# Patient Record
Sex: FEMALE | Race: WHITE | NOT HISPANIC OR LATINO
[De-identification: names, ages, dates, MRNs, and addresses within clinical notes are randomized per-mention and may not be internally consistent; named-entity substitution may affect disease eponyms.]

---

## 2017-01-09 PROBLEM — Z00.00 ENCOUNTER FOR PREVENTIVE HEALTH EXAMINATION: Status: ACTIVE | Noted: 2017-01-09

## 2017-09-06 ENCOUNTER — APPOINTMENT (OUTPATIENT)
Dept: CARDIOLOGY | Facility: CLINIC | Age: 67
End: 2017-09-06

## 2017-10-30 ENCOUNTER — APPOINTMENT (OUTPATIENT)
Dept: CARDIOLOGY | Facility: CLINIC | Age: 67
End: 2017-10-30
Payer: MEDICARE

## 2017-10-30 PROCEDURE — 93000 ELECTROCARDIOGRAM COMPLETE: CPT

## 2017-10-30 PROCEDURE — 99214 OFFICE O/P EST MOD 30 MIN: CPT

## 2017-12-14 ENCOUNTER — APPOINTMENT (OUTPATIENT)
Dept: CARDIOLOGY | Facility: CLINIC | Age: 67
End: 2017-12-14
Payer: MEDICARE

## 2017-12-14 PROCEDURE — 93880 EXTRACRANIAL BILAT STUDY: CPT

## 2017-12-14 PROCEDURE — 93306 TTE W/DOPPLER COMPLETE: CPT

## 2018-01-23 ENCOUNTER — APPOINTMENT (OUTPATIENT)
Dept: CARDIOLOGY | Facility: CLINIC | Age: 68
End: 2018-01-23
Payer: MEDICARE

## 2018-01-23 VITALS
DIASTOLIC BLOOD PRESSURE: 60 MMHG | WEIGHT: 136 LBS | BODY MASS INDEX: 22.39 KG/M2 | SYSTOLIC BLOOD PRESSURE: 100 MMHG | HEIGHT: 65.5 IN | HEART RATE: 84 BPM

## 2018-01-23 DIAGNOSIS — Z82.0 FAMILY HISTORY OF EPILEPSY AND OTHER DISEASES OF THE NERVOUS SYSTEM: ICD-10-CM

## 2018-01-23 DIAGNOSIS — Z87.891 PERSONAL HISTORY OF NICOTINE DEPENDENCE: ICD-10-CM

## 2018-01-23 DIAGNOSIS — Z80.9 FAMILY HISTORY OF MALIGNANT NEOPLASM, UNSPECIFIED: ICD-10-CM

## 2018-01-23 PROCEDURE — 99214 OFFICE O/P EST MOD 30 MIN: CPT

## 2018-02-09 ENCOUNTER — OUTPATIENT (OUTPATIENT)
Dept: OUTPATIENT SERVICES | Facility: HOSPITAL | Age: 68
LOS: 1 days | End: 2018-02-09
Payer: MEDICARE

## 2018-02-09 ENCOUNTER — TRANSCRIPTION ENCOUNTER (OUTPATIENT)
Age: 68
End: 2018-02-09

## 2018-02-09 VITALS
SYSTOLIC BLOOD PRESSURE: 104 MMHG | DIASTOLIC BLOOD PRESSURE: 55 MMHG | HEART RATE: 65 BPM | RESPIRATION RATE: 16 BRPM | OXYGEN SATURATION: 100 %

## 2018-02-09 VITALS
RESPIRATION RATE: 17 BRPM | HEIGHT: 64 IN | WEIGHT: 134.48 LBS | TEMPERATURE: 99 F | OXYGEN SATURATION: 100 % | SYSTOLIC BLOOD PRESSURE: 121 MMHG | DIASTOLIC BLOOD PRESSURE: 71 MMHG | HEART RATE: 66 BPM

## 2018-02-09 DIAGNOSIS — H35.341 MACULAR CYST, HOLE, OR PSEUDOHOLE, RIGHT EYE: ICD-10-CM

## 2018-02-09 DIAGNOSIS — Z98.41 CATARACT EXTRACTION STATUS, RIGHT EYE: Chronic | ICD-10-CM

## 2018-02-09 DIAGNOSIS — Z98.890 OTHER SPECIFIED POSTPROCEDURAL STATES: Chronic | ICD-10-CM

## 2018-02-09 PROCEDURE — 67042 VIT FOR MACULAR HOLE: CPT | Mod: RT

## 2018-02-09 PROCEDURE — C1889: CPT

## 2018-02-09 NOTE — ASU DISCHARGE PLAN (ADULT/PEDIATRIC). - NOTIFY
Pain not relieved by Medications/Persistent Nausea and Vomiting/Fever greater than 101/Bleeding that does not stop/Swelling that continues

## 2018-02-09 NOTE — ASU DISCHARGE PLAN (ADULT/PEDIATRIC). - PT EDUC
other (specify)/[employer] gas card, green bracelet, Eye shield with instructions , sunglasses and eye kit given to patient.

## 2018-02-09 NOTE — ASU DISCHARGE PLAN (ADULT/PEDIATRIC). - SPECIAL INSTRUCTIONS
Tylenol as directed as needed for pain. Tylenol as directed as needed for pain.  Face Down position for 3 days as much as possible.

## 2019-02-21 PROBLEM — G25.81 RESTLESS LEGS SYNDROME: Chronic | Status: ACTIVE | Noted: 2018-02-09

## 2019-02-21 PROBLEM — E78.5 HYPERLIPIDEMIA, UNSPECIFIED: Chronic | Status: ACTIVE | Noted: 2018-02-09

## 2019-02-21 PROBLEM — E03.9 HYPOTHYROIDISM, UNSPECIFIED: Chronic | Status: ACTIVE | Noted: 2018-02-09

## 2019-02-28 ENCOUNTER — APPOINTMENT (OUTPATIENT)
Dept: CARDIOLOGY | Facility: CLINIC | Age: 69
End: 2019-02-28

## 2019-03-18 ENCOUNTER — RECORD ABSTRACTING (OUTPATIENT)
Age: 69
End: 2019-03-18

## 2019-03-25 ENCOUNTER — APPOINTMENT (OUTPATIENT)
Dept: CARDIOLOGY | Facility: CLINIC | Age: 69
End: 2019-03-25

## 2021-06-07 ENCOUNTER — NON-APPOINTMENT (OUTPATIENT)
Age: 71
End: 2021-06-07

## 2021-06-11 ENCOUNTER — APPOINTMENT (OUTPATIENT)
Dept: CARDIOLOGY | Facility: CLINIC | Age: 71
End: 2021-06-11
Payer: MEDICARE

## 2021-06-11 ENCOUNTER — NON-APPOINTMENT (OUTPATIENT)
Age: 71
End: 2021-06-11

## 2021-06-11 VITALS
DIASTOLIC BLOOD PRESSURE: 62 MMHG | WEIGHT: 133 LBS | SYSTOLIC BLOOD PRESSURE: 102 MMHG | OXYGEN SATURATION: 98 % | BODY MASS INDEX: 22.16 KG/M2 | HEART RATE: 76 BPM | HEIGHT: 65 IN

## 2021-06-11 PROCEDURE — 99204 OFFICE O/P NEW MOD 45 MIN: CPT

## 2021-06-11 PROCEDURE — 93000 ELECTROCARDIOGRAM COMPLETE: CPT

## 2021-06-11 RX ORDER — B-COMPLEX WITH VITAMIN C
TABLET ORAL
Refills: 0 | Status: ACTIVE | COMMUNITY

## 2021-06-11 NOTE — CARDIOLOGY SUMMARY
[de-identified] : June 11, 2021.  Normal sinus rhythm.  Left anterior hemiblock.  Prominent R wave.  In V1.  Nonspecific ST-T changes.

## 2021-06-11 NOTE — CARDIOLOGY SUMMARY
[de-identified] : June 11, 2021.  Normal sinus rhythm.  Left anterior hemiblock.  Prominent R wave.  In V1.  Nonspecific ST-T changes.

## 2021-06-11 NOTE — REVIEW OF SYSTEMS
[SOB] : no shortness of breath [Dyspnea on exertion] : dyspnea during exertion [Chest Discomfort] : no chest discomfort [Lower Ext Edema] : no extremity edema [Leg Claudication] : no intermittent leg claudication [Palpitations] : no palpitations [Orthopnea] : no orthopnea [PND] : no PND [Syncope] : no syncope [Joint Pain] : no joint pain [Joint Swelling] : no joint swelling [Joint Stiffness] : no joint stiffness [Muscle Cramps] : muscle cramps [Myalgia] : no myalgia [Negative] : Heme/Lymph

## 2021-06-11 NOTE — PHYSICAL EXAM
[Well Developed] : well developed [Well Nourished] : well nourished [No Acute Distress] : no acute distress [Normal Conjunctiva] : normal conjunctiva [Normal Venous Pressure] : normal venous pressure [Normal S1, S2] : normal S1, S2 [No Rub] : no rub [No Gallop] : no gallop [Clear Lung Fields] : clear lung fields [Good Air Entry] : good air entry [No Respiratory Distress] : no respiratory distress  [Soft] : abdomen soft [Non Tender] : non-tender [No Masses/organomegaly] : no masses/organomegaly [Normal Bowel Sounds] : normal bowel sounds [Normal Gait] : normal gait [No Edema] : no edema [No Cyanosis] : no cyanosis [No Clubbing] : no clubbing [No Varicosities] : no varicosities [No Rash] : no rash [No Skin Lesions] : no skin lesions [Moves all extremities] : moves all extremities [No Focal Deficits] : no focal deficits [Normal Speech] : normal speech [Alert and Oriented] : alert and oriented [Normal memory] : normal memory [de-identified] : Decreased carotid upstroke.  Radiated systolic murmur probably at left carotid base. [de-identified] : Systolic ejection murmur 1-2 over 6 at the base.

## 2021-06-11 NOTE — PHYSICAL EXAM
[Well Developed] : well developed [Well Nourished] : well nourished [No Acute Distress] : no acute distress [Normal Conjunctiva] : normal conjunctiva [Normal Venous Pressure] : normal venous pressure [Normal S1, S2] : normal S1, S2 [No Rub] : no rub [No Gallop] : no gallop [Clear Lung Fields] : clear lung fields [Good Air Entry] : good air entry [No Respiratory Distress] : no respiratory distress  [Soft] : abdomen soft [Non Tender] : non-tender [No Masses/organomegaly] : no masses/organomegaly [Normal Bowel Sounds] : normal bowel sounds [Normal Gait] : normal gait [No Edema] : no edema [No Cyanosis] : no cyanosis [No Clubbing] : no clubbing [No Varicosities] : no varicosities [No Rash] : no rash [No Skin Lesions] : no skin lesions [Moves all extremities] : moves all extremities [No Focal Deficits] : no focal deficits [Normal Speech] : normal speech [Alert and Oriented] : alert and oriented [Normal memory] : normal memory [de-identified] : Decreased carotid upstroke.  Radiated systolic murmur probably at left carotid base. [de-identified] : Systolic ejection murmur 1-2 over 6 at the base.

## 2021-06-11 NOTE — HISTORY OF PRESENT ILLNESS
[FreeTextEntry1] : 70-year-old  hyperlipidemia, seeing her nutritionist and  decided unchanged cholesterol lowering agent.\par She has a history of incomplete right bundle branch block, nonischemic stress echocardiogram, mild mitral and aortic insufficiency, gastroesophageal reflux disease, carotid atherosclerosis, hyperlipidemia diet controlled head decided against statin therapy.\par Occasional rare intermittent palpitation without associated dizziness lightheadedness near syncopal or syncopal event\par Stable exertional dyspnea without any associated chest pain no PND orthopnea pedal edema\par Intermittent leg cramps nonexertional\par No recent hospital admission\par

## 2021-06-11 NOTE — REASON FOR VISIT
[Follow-Up - Clinic] : a clinic follow-up of [Abnormal ECG] : an abnormal ECG [Hyperlipidemia] : hyperlipidemia [Mitral Regurgitation] : mitral regurgitation [FreeTextEntry1] : Murmur

## 2021-06-11 NOTE — ASSESSMENT
[FreeTextEntry1] : January 23, 2018.\par Carotid Doppler study. Possible mild atherosclerosis.\par Echocardiogram. December 14, 2017. Preserved systolic function. Mitral regurgitation.\par Labs from October 2017 reviewed.\par Coronary calcium score January 31, 2018 was 0.\par \par Reviewed on June 11, 2021.\par EKG as noted above.\par Most recent labs not available.\par Last labs from September 2017 had shown LDL cholesterol 203  triglycerides 57 total cholesterol 319\par Most recent labs are requested\par \par

## 2021-06-11 NOTE — DISCUSSION/SUMMARY
[FreeTextEntry1] : 70-year-old female with above medical history and active medical problems as noted below\par 1.  Abnormal EKG.  Left anterior hemiblock.  Nonspecific ST-T changes.  Intermittent palpitations.  Atherosclerotic vascular disease with carotid Doppler study in the past showing mild atherosclerosis.\par Stress echocardiogram ordered to assess evaluate and rule out ischemia as a reason for her abnormal EKG.\par 2.  Systolic murmur.  History of mitral and aortic insufficiency.  Borderline pulmonary artery systolic pressure.  Echocardiogram ordered.\par 3.  Carotid Doppler study ordered follow-up on carotid atherosclerotic vascular disease\par 4.  Will obtain labs.  Reviewed with her regarding prior laboratory data.  LDL cholesterol greater than 190.  Evidence of atherosclerotic vascular disease with carotid Doppler study.  Discussed with her regarding risk-benefit alternatives of statin therapy.\par Side effects profile reviewed.  She would like to give it ago with dietary restrictions for next 2 months.  Will repeat fasting lipid panel CRP level.  Based on about test and her labs on diet control will discuss further regarding statin therapy with her with associated risk and benefits.  She understands and agrees with the management plan.\par 6.  Rare intermittent palpitations.  If worsening will consider cardiac monitoring.  At present she has no significant symptoms associated with those palpitations.\par \par Counseling regarding low saturated fat, salt and carbohydrate intake was reviewed. Active lifestyle and regular. Exercise along with weight management is advised.\par All the above were at length reviewed. Answered all the questions. Thank you very much for this kind referral. Please do not hesitate to give me a call for any question.\par Part of this transcription was done with voice recognition software and phonetically similar errors are common. I apologize for that. Please do not hesitate to call for any questions due to above.\par Follow-up 2 months\par

## 2021-07-20 ENCOUNTER — APPOINTMENT (OUTPATIENT)
Dept: CARDIOLOGY | Facility: CLINIC | Age: 71
End: 2021-07-20
Payer: MEDICARE

## 2021-07-20 ENCOUNTER — RESULT CHARGE (OUTPATIENT)
Age: 71
End: 2021-07-20

## 2021-07-20 PROCEDURE — 93306 TTE W/DOPPLER COMPLETE: CPT

## 2021-07-20 PROCEDURE — 93880 EXTRACRANIAL BILAT STUDY: CPT

## 2021-07-27 ENCOUNTER — NON-APPOINTMENT (OUTPATIENT)
Age: 71
End: 2021-07-27

## 2021-09-09 ENCOUNTER — APPOINTMENT (OUTPATIENT)
Dept: CARDIOLOGY | Facility: CLINIC | Age: 71
End: 2021-09-09
Payer: MEDICARE

## 2021-09-09 PROCEDURE — 93351 STRESS TTE COMPLETE: CPT

## 2021-09-14 ENCOUNTER — APPOINTMENT (OUTPATIENT)
Dept: CARDIOLOGY | Facility: CLINIC | Age: 71
End: 2021-09-14
Payer: MEDICARE

## 2021-09-14 VITALS
HEIGHT: 65 IN | HEART RATE: 80 BPM | SYSTOLIC BLOOD PRESSURE: 96 MMHG | WEIGHT: 134 LBS | OXYGEN SATURATION: 98 % | BODY MASS INDEX: 22.33 KG/M2 | DIASTOLIC BLOOD PRESSURE: 58 MMHG

## 2021-09-14 PROCEDURE — 99214 OFFICE O/P EST MOD 30 MIN: CPT

## 2021-09-14 NOTE — ASSESSMENT
[FreeTextEntry1] : January 23, 2018.\par Carotid Doppler study. Possible mild atherosclerosis.\par Echocardiogram. December 14, 2017. Preserved systolic function. Mitral regurgitation.\par Labs from October 2017 reviewed.\par Coronary calcium score January 31, 2018 was 0.\par \par Reviewed on June 11, 2021.\par EKG as noted above.\par Most recent labs not available.\par Last labs from September 2017 had shown LDL cholesterol 203  triglycerides 57 total cholesterol 319\par Most recent labs are requested\par \par reviewed 9/14/21: \par ldl 152, hdl 89, trig 68

## 2021-09-14 NOTE — REASON FOR VISIT
[Follow-Up - Clinic] : a clinic follow-up of [Abnormal ECG] : an abnormal ECG [Hyperlipidemia] : hyperlipidemia [Mitral Regurgitation] : mitral regurgitation [Symptom and Test Evaluation] : symptom and test evaluation [FreeTextEntry3] : Dr. Raines [FreeTextEntry1] : Murmur

## 2021-09-14 NOTE — REVIEW OF SYSTEMS
[Dyspnea on exertion] : dyspnea during exertion [Muscle Cramps] : muscle cramps [Negative] : Heme/Lymph [SOB] : no shortness of breath [Chest Discomfort] : no chest discomfort [Lower Ext Edema] : no extremity edema [Leg Claudication] : no intermittent leg claudication [Palpitations] : no palpitations [Orthopnea] : no orthopnea [PND] : no PND [Syncope] : no syncope [Joint Pain] : no joint pain [Joint Swelling] : no joint swelling [Joint Stiffness] : no joint stiffness [Myalgia] : no myalgia

## 2021-09-14 NOTE — DISCUSSION/SUMMARY
[FreeTextEntry1] : 71-year-old female with above medical history and active medical problems as noted below\par 1.  Abnormal EKG.  Left anterior hemiblock.  Nonspecific ST-T changes.  Intermittent palpitations.  Atherosclerotic vascular disease with carotid Doppler study in the past showing mild atherosclerosis.\par Stress echocardiogram did not reveal any significant high risk ischemia.  Limitation of the test was reviewed.  Continue lifestyle and risk factor modifications.  Change in clinical status may require further invasive or noninvasive testing.\par 2.  Echocardiogram reviewed.  Nonrheumatic mitral and aortic insufficiency stable.  Stable LV left atrial dimensions.  Stable pulmonary artery systolic pressure.  Consider follow-up in 2 to 3 years or for change in symptoms\par 3.  Worsening carotid atherosclerotic vascular disease with high velocity probably related to also tortuosity.  no neurological events.  Reviewed risk benefits alternatives of use of aspirin 81 mg and statin therapy.  She will think about it and let me know.  She understands risk benefits alternatives and options available.  If decided she will start aspirin 81 mg.  And would recommend to give it a try for rosuvastatin 20 mg once daily at nighttime.  She will continue with her aggressive lifestyle and risk factor modifications.\par 4. Rare intermittent palpitations.  If worsening will consider cardiac monitoring.  At present she has no significant symptoms associated with those palpitations.\par \par Counseling regarding low saturated fat, salt and carbohydrate intake was reviewed. Active lifestyle and regular. Exercise along with weight management is advised.\par All the above were at length reviewed. Answered all the questions. Thank you very much for this kind referral. Please do not hesitate to give me a call for any question.\par Part of this transcription was done with voice recognition software and phonetically similar errors are common. I apologize for that. Please do not hesitate to call for any questions due to above.\par \par Follow-up in 1 year.\par

## 2021-09-14 NOTE — HISTORY OF PRESENT ILLNESS
[FreeTextEntry1] : 71-year-old  hyperlipidemia, seeing her nutritionist and  decided unchanged cholesterol lowering agent.\par She has a history of incomplete right bundle branch block, nonischemic stress echocardiogram, mild mitral and aortic insufficiency, gastroesophageal reflux disease, carotid atherosclerosis, hyperlipidemia diet controlled head decided against statin therapy.\par Occasional rare intermittent palpitation without associated dizziness lightheadedness near syncopal or syncopal event\par Stable exertional dyspnea without any associated chest pain no PND orthopnea pedal edema\par Intermittent leg cramps nonexertional\par No recent hospital admission\par

## 2021-09-14 NOTE — CARDIOLOGY SUMMARY
[de-identified] : June 11, 2021.  Normal sinus rhythm.  Left anterior hemiblock.  Prominent R wave.  In V1.  Nonspecific ST-T changes. [de-identified] : 9/9/21: secho; nnischemic symptoms/ekg/echo at 8 mets.  [de-identified] : 7/20/21 lvef 60% mild mr/ar  [de-identified] : 7/20/21 MIKIE 50-69% tortuosity.

## 2021-09-14 NOTE — OBJECTIVE
Routing as a FYI.   [History reviewed] : History reviewed. [Medications and Allergies reviewed] : Medications and allergies reviewed.

## 2021-09-14 NOTE — PHYSICAL EXAM
[Well Developed] : well developed [Well Nourished] : well nourished [No Acute Distress] : no acute distress [Normal Conjunctiva] : normal conjunctiva [Normal Venous Pressure] : normal venous pressure [Normal S1, S2] : normal S1, S2 [No Rub] : no rub [No Gallop] : no gallop [Clear Lung Fields] : clear lung fields [Good Air Entry] : good air entry [Soft] : abdomen soft [No Respiratory Distress] : no respiratory distress  [Non Tender] : non-tender [No Masses/organomegaly] : no masses/organomegaly [Normal Bowel Sounds] : normal bowel sounds [Normal Gait] : normal gait [No Edema] : no edema [No Cyanosis] : no cyanosis [No Clubbing] : no clubbing [No Varicosities] : no varicosities [No Rash] : no rash [No Skin Lesions] : no skin lesions [Moves all extremities] : moves all extremities [No Focal Deficits] : no focal deficits [Normal Speech] : normal speech [Alert and Oriented] : alert and oriented [Normal memory] : normal memory [de-identified] : Decreased carotid upstroke.  Radiated systolic murmur probably at left carotid base. [de-identified] : Systolic ejection murmur 1-2 over 6 at the base.

## 2022-08-03 ENCOUNTER — APPOINTMENT (OUTPATIENT)
Dept: OPHTHALMOLOGY | Facility: CLINIC | Age: 72
End: 2022-08-03

## 2022-08-03 ENCOUNTER — NON-APPOINTMENT (OUTPATIENT)
Age: 72
End: 2022-08-03

## 2022-08-03 PROCEDURE — 92134 CPTRZ OPH DX IMG PST SGM RTA: CPT

## 2022-08-03 PROCEDURE — 99204 OFFICE O/P NEW MOD 45 MIN: CPT

## 2022-09-13 ENCOUNTER — APPOINTMENT (OUTPATIENT)
Dept: CARDIOLOGY | Facility: CLINIC | Age: 72
End: 2022-09-13

## 2022-09-27 ENCOUNTER — APPOINTMENT (OUTPATIENT)
Dept: CARDIOLOGY | Facility: CLINIC | Age: 72
End: 2022-09-27

## 2022-10-25 ENCOUNTER — APPOINTMENT (OUTPATIENT)
Dept: OPHTHALMOLOGY | Facility: CLINIC | Age: 72
End: 2022-10-25

## 2022-12-02 ENCOUNTER — APPOINTMENT (OUTPATIENT)
Dept: OPHTHALMOLOGY | Facility: CLINIC | Age: 72
End: 2022-12-02

## 2023-01-03 ENCOUNTER — APPOINTMENT (OUTPATIENT)
Dept: OPHTHALMOLOGY | Facility: CLINIC | Age: 73
End: 2023-01-03

## 2023-01-11 ENCOUNTER — APPOINTMENT (OUTPATIENT)
Dept: OPHTHALMOLOGY | Facility: CLINIC | Age: 73
End: 2023-01-11

## 2023-01-17 ENCOUNTER — NON-APPOINTMENT (OUTPATIENT)
Age: 73
End: 2023-01-17

## 2023-01-18 ENCOUNTER — NON-APPOINTMENT (OUTPATIENT)
Age: 73
End: 2023-01-18

## 2023-01-30 ENCOUNTER — NON-APPOINTMENT (OUTPATIENT)
Age: 73
End: 2023-01-30

## 2023-01-30 ENCOUNTER — APPOINTMENT (OUTPATIENT)
Dept: CARDIOLOGY | Facility: CLINIC | Age: 73
End: 2023-01-30
Payer: MEDICARE

## 2023-01-30 VITALS
OXYGEN SATURATION: 99 % | DIASTOLIC BLOOD PRESSURE: 68 MMHG | HEIGHT: 65 IN | TEMPERATURE: 96.6 F | HEART RATE: 74 BPM | SYSTOLIC BLOOD PRESSURE: 106 MMHG | WEIGHT: 130 LBS | BODY MASS INDEX: 21.66 KG/M2

## 2023-01-30 PROCEDURE — 99214 OFFICE O/P EST MOD 30 MIN: CPT

## 2023-01-30 PROCEDURE — 93000 ELECTROCARDIOGRAM COMPLETE: CPT

## 2023-01-30 NOTE — HISTORY OF PRESENT ILLNESS
[FreeTextEntry1] : 22-year-old female comes in for follow-up consultation.  Last seen in 2021.  Reviewed labs and EKG\par She has a history of incomplete right bundle branch block, nonischemic stress echocardiogram, mild mitral and aortic insufficiency, gastroesophageal reflux disease, carotid atherosclerosis, hyperlipidemia diet controlled head decided against statin therapy.\par Occasional rare intermittent palpitation without associated dizziness lightheadedness near syncopal or syncopal event\par Stable exertional dyspnea without any associated chest pain no PND orthopnea pedal edema\par Intermittent leg cramps nonexertional\par No recent hospital admission\par

## 2023-01-30 NOTE — ASSESSMENT
[FreeTextEntry1] : January 23, 2018.\par Carotid Doppler study. Possible mild atherosclerosis.\par Echocardiogram. December 14, 2017. Preserved systolic function. Mitral regurgitation.\par Labs from October 2017 reviewed.\par Coronary calcium score January 31, 2018 was 0.\par \par Reviewed on June 11, 2021.\par EKG as noted above.\par Most recent labs not available.\par Last labs from September 2017 had shown LDL cholesterol 203  triglycerides 57 total cholesterol 319\par Most recent labs are requested\par \par reviewed 9/14/21: \par ldl 152, hdl 89, trig 68\par \par Reviewed on January 30, 2023\par EKG as noted above\par Labs from January 9, 2023 reviewed CBC stable CMP normal  HDL 83 triglycerides 80

## 2023-01-30 NOTE — REVIEW OF SYSTEMS
[Dyspnea on exertion] : dyspnea during exertion [Muscle Cramps] : muscle cramps [Negative] : Heme/Lymph [SOB] : no shortness of breath [Chest Discomfort] : no chest discomfort [Lower Ext Edema] : no extremity edema [Leg Claudication] : no intermittent leg claudication [Palpitations] : no palpitations [Orthopnea] : no orthopnea [PND] : no PND [Syncope] : no syncope [Joint Pain] : no joint pain [Joint Swelling] : no joint swelling [Joint Stiffness] : no joint stiffness [Myalgia] : no myalgia [FreeTextEntry5] : As per HPI

## 2023-01-30 NOTE — CARDIOLOGY SUMMARY
[de-identified] : June 11, 2021.  Normal sinus rhythm.  Left anterior hemiblock.  Prominent R wave.  In V1.  Nonspecific ST-T changes.\par January 30, 2023 normal sinus rhythm left anterior hemiblock nonspecific ST-T change [de-identified] : 9/9/21: secho; nnischemic symptoms/ekg/echo at 8 mets.  [de-identified] : 7/20/21 lvef 60% mild mr/ar  [de-identified] : 7/20/21 MIKIE 50-69% tortuosity.

## 2023-01-30 NOTE — PHYSICAL EXAM
[Well Developed] : well developed [Well Nourished] : well nourished [No Acute Distress] : no acute distress [Normal Venous Pressure] : normal venous pressure [Normal S1, S2] : normal S1, S2 [No Rub] : no rub [No Gallop] : no gallop [Clear Lung Fields] : clear lung fields [Good Air Entry] : good air entry [No Respiratory Distress] : no respiratory distress  [Normal Gait] : normal gait [No Edema] : no edema [No Cyanosis] : no cyanosis [No Clubbing] : no clubbing [No Varicosities] : no varicosities [Moves all extremities] : moves all extremities [Normal Speech] : normal speech [Alert and Oriented] : alert and oriented [Normal Radial B/L] : normal radial B/L [Normal DP B/L] : normal DP B/L [de-identified] : Decreased carotid upstroke.  Radiated systolic murmur probably at left carotid base. [de-identified] : Systolic ejection murmur 2-3 over 6 at the base.

## 2023-01-30 NOTE — DISCUSSION/SUMMARY
[FreeTextEntry1] : 72-year-old female with above medical history and active medical problems as noted below\par 1.  Abnormal EKG.  Left anterior hemiblock.  Nonspecific ST-T changes.  Intermittent palpitations.  Atherosclerotic vascular disease with carotid Doppler study in the past showing mild atherosclerosis.\par Stress echocardiogram did not reveal any significant high risk ischemia.  Again reviewed risk benefits alternatives of statin therapy.  She has declined.  Repeat carotid Doppler study if there is progressive worsening we will review again role of pharmacotherapy for lipid management\par 2.   Nonrheumatic mitral and aortic insufficiency.  Follow-up echocardiogram\par 3.  Carotid Doppler study.  Follow-up recommend\par 4. Rare intermittent palpitations.  If worsening will consider cardiac monitoring.  At present she has no significant symptoms associated with those palpitations.\par #5 coronary restratification.  Coronary calcium score recommended which may help us to also decide about statin therapy which she is declining.\par \par Counseling regarding low saturated fat, salt and carbohydrate intake was reviewed. Active lifestyle and regular. Exercise along with weight management is advised.\par All the above were at length reviewed. Answered all the questions. Thank you very much for this kind referral. Please do not hesitate to give me a call for any question.\par Part of this transcription was done with voice recognition software and phonetically similar errors are common. I apologize for that. Please do not hesitate to call for any questions due to above.\par Sincerely,\par Timoteo Amezquita MD,FACC,FERNANDA\par \par \par

## 2023-01-30 NOTE — REASON FOR VISIT
[Symptom and Test Evaluation] : symptom and test evaluation [Follow-Up - Clinic] : a clinic follow-up of [Abnormal ECG] : an abnormal ECG [Hyperlipidemia] : hyperlipidemia [Mitral Regurgitation] : mitral regurgitation [FreeTextEntry1] : Murmur [FreeTextEntry3] : Dr. Raines

## 2023-02-10 ENCOUNTER — APPOINTMENT (OUTPATIENT)
Dept: CARDIOLOGY | Facility: CLINIC | Age: 73
End: 2023-02-10
Payer: MEDICARE

## 2023-02-10 PROCEDURE — 93306 TTE W/DOPPLER COMPLETE: CPT

## 2023-02-10 PROCEDURE — 93880 EXTRACRANIAL BILAT STUDY: CPT

## 2023-02-23 ENCOUNTER — APPOINTMENT (OUTPATIENT)
Dept: CARDIOLOGY | Facility: CLINIC | Age: 73
End: 2023-02-23
Payer: MEDICARE

## 2023-02-23 VITALS
DIASTOLIC BLOOD PRESSURE: 72 MMHG | HEIGHT: 65 IN | TEMPERATURE: 97.1 F | OXYGEN SATURATION: 98 % | HEART RATE: 88 BPM | SYSTOLIC BLOOD PRESSURE: 110 MMHG | WEIGHT: 136 LBS | BODY MASS INDEX: 22.66 KG/M2

## 2023-02-23 DIAGNOSIS — I08.0 RHEUMATIC DISORDERS OF BOTH MITRAL AND AORTIC VALVES: ICD-10-CM

## 2023-02-23 PROCEDURE — 99214 OFFICE O/P EST MOD 30 MIN: CPT

## 2023-02-23 RX ORDER — THYROID, PORCINE 15 MG/1
15 TABLET ORAL DAILY
Refills: 0 | Status: ACTIVE | COMMUNITY

## 2023-02-23 RX ORDER — ESTRADIOL 0.1 MG/G
0.1 CREAM VAGINAL
Refills: 0 | Status: DISCONTINUED | COMMUNITY
End: 2023-02-23

## 2023-02-23 NOTE — PHYSICAL EXAM
[Well Developed] : well developed [Well Nourished] : well nourished [No Acute Distress] : no acute distress [Normal Venous Pressure] : normal venous pressure [Normal S1, S2] : normal S1, S2 [No Rub] : no rub [No Gallop] : no gallop [Clear Lung Fields] : clear lung fields [Good Air Entry] : good air entry [No Respiratory Distress] : no respiratory distress  [Normal Gait] : normal gait [No Edema] : no edema [No Cyanosis] : no cyanosis [No Clubbing] : no clubbing [No Varicosities] : no varicosities [Normal Radial B/L] : normal radial B/L [Normal DP B/L] : normal DP B/L [Moves all extremities] : moves all extremities [Normal Speech] : normal speech [Alert and Oriented] : alert and oriented [de-identified] : Decreased carotid upstroke.  Radiated systolic murmur probably at left carotid base. [de-identified] : Systolic ejection murmur 2-3 over 6 at the base.

## 2023-02-23 NOTE — DISCUSSION/SUMMARY
[FreeTextEntry1] : 72-year-old female with above medical history and active medical problems as noted below\par 1.  Abnormal EKG.  Left anterior hemiblock.  Nonspecific ST-T changes.  Intermittent palpitations.  Atherosclerotic vascular disease with carotid Doppler study in the past showing mild atherosclerosis.\par Stress echocardiogram did not reveal any significant high risk ischemia.  Again reviewed risk benefits alternatives of statin therapy.  She has declined.  Repeat carotid Doppler study if there is progressive worsening we will review again role of pharmacotherapy for lipid management\par 2.   Nonrheumatic mitral and aortic insufficiency.  Follow-up echocardiogram\par 3.  Carotid Doppler study.  Follow-up recommend\par 4. Rare intermittent palpitations.  If worsening will consider cardiac monitoring.  At present she has no significant symptoms associated with those palpitations.\par #5 coronary restratification.  Coronary calcium score recommended which may help us to also decide about statin therapy which she is declining.\par \par \par On today's visit I reviewed results of echocardiogram and carotid ultrasound with the patient.  She has known CAD calcium score which was 0 in the beginning of this year.  She is physically very active exercising boxing 5 times a week.  Transthoracic echo shows normal ejection fraction and no significant valvular heart disease.  Carotid ultrasound reviewed minimal carotid atherosclerotic sclerosis.  She has known high LDL.  She was offered statin however she is declining.  I asked her to start baby aspirin.  She has scheduled repeat blood work and follow-up with her cardiologist.  She wants to try to improve diet and see if there is any improvement in lipid profile.\par \par \par

## 2023-02-23 NOTE — CARDIOLOGY SUMMARY
[de-identified] : June 11, 2021.  Normal sinus rhythm.  Left anterior hemiblock.  Prominent R wave.  In V1.  Nonspecific ST-T changes.\par January 30, 2023 normal sinus rhythm left anterior hemiblock nonspecific ST-T change [de-identified] : 9/9/21: secho; nnischemic symptoms/ekg/echo at 8 mets.  [de-identified] : 7/20/21 lvef 60% mild mr/ar  [de-identified] : 7/20/21 MIKIE 50-69% tortuosity.

## 2023-02-23 NOTE — HISTORY OF PRESENT ILLNESS
[FreeTextEntry1] : 72-year-old female comes in for follow-up .  Reviewed labs and EKG\par She has a history of incomplete right bundle branch block, nonischemic stress echocardiogram, mild mitral and aortic insufficiency, gastroesophageal reflux disease, carotid atherosclerosis, hyperlipidemia diet controlled head decided against statin therapy.\par Occasional rare intermittent palpitation without associated dizziness lightheadedness near syncopal or syncopal event\par Stable exertional dyspnea without any associated chest pain no PND orthopnea pedal edema\par Intermittent leg cramps nonexertional\par No recent hospital admission\par

## 2023-02-24 ENCOUNTER — APPOINTMENT (OUTPATIENT)
Dept: ORTHOPEDIC SURGERY | Facility: CLINIC | Age: 73
End: 2023-02-24
Payer: MEDICARE

## 2023-02-24 VITALS — BODY MASS INDEX: 23.22 KG/M2 | WEIGHT: 136 LBS | HEIGHT: 64 IN

## 2023-02-24 DIAGNOSIS — M19.049 PRIMARY OSTEOARTHRITIS, UNSPECIFIED HAND: ICD-10-CM

## 2023-02-24 DIAGNOSIS — Z78.9 OTHER SPECIFIED HEALTH STATUS: ICD-10-CM

## 2023-02-24 PROCEDURE — 99203 OFFICE O/P NEW LOW 30 MIN: CPT

## 2023-02-24 PROCEDURE — 73130 X-RAY EXAM OF HAND: CPT | Mod: 50

## 2023-02-24 NOTE — HISTORY OF PRESENT ILLNESS
[de-identified] : 72F, RHD, PMHx of Thyroid Disease presents with bilateral hand pain, particularly left hand small finger lack of motion. Some other fingers are causing pain. Has had symptoms for the last 6-12 months. Admits to having arthritic fingers. Denies numbness/tingling. Denies outside imaging/treatment. Denies injury/trauma.

## 2023-02-24 NOTE — IMAGING
[de-identified] : Bilateral hand with diffuse IP arthrosis, +ttp. Able to make fist, oppose thumb to small finger and abduct fingers. Sensation intact throughout. <2sec cap refill.\par \par Bilateral hand radiographs with no fracture nor dislocation. Advanced diffuse DIP and PIP arthrosis.

## 2023-02-24 NOTE — ASSESSMENT
[FreeTextEntry1] : Diffuse finger IP arthrosis - reviewed radiographs and pathoanatomy with patient. Discussed management to consist of NSAIDs prn, finger sleeves, OT prn. Fusion option for advanced pain.\par \par F/u prn

## 2023-03-29 ENCOUNTER — APPOINTMENT (OUTPATIENT)
Dept: CARDIOLOGY | Facility: CLINIC | Age: 73
End: 2023-03-29
Payer: MEDICARE

## 2023-03-29 VITALS
OXYGEN SATURATION: 97 % | BODY MASS INDEX: 23.39 KG/M2 | HEART RATE: 77 BPM | HEIGHT: 64 IN | WEIGHT: 137 LBS | SYSTOLIC BLOOD PRESSURE: 102 MMHG | DIASTOLIC BLOOD PRESSURE: 60 MMHG

## 2023-03-29 PROCEDURE — 99215 OFFICE O/P EST HI 40 MIN: CPT

## 2023-03-29 RX ORDER — MULTIVIT-MIN/FOLIC/VIT K/LYCOP 400-300MCG
1000 TABLET ORAL
Refills: 0 | Status: ACTIVE | COMMUNITY

## 2023-03-29 NOTE — HISTORY OF PRESENT ILLNESS
[FreeTextEntry1] : 72-year-old female comes in for follow-up consultation.  Reviewed labs.  Labs were reviewed tests were reviewed\par She has a history of incomplete right bundle branch block, nonischemic stress echocardiogram, mild mitral and aortic insufficiency, gastroesophageal reflux disease, carotid atherosclerosis, hyperlipidemia diet controlled head decided against statin therapy.\par Occasional rare intermittent palpitation without associated dizziness lightheadedness near syncopal or syncopal event\par Stable exertional dyspnea without any associated chest pain no PND orthopnea pedal edema\par Intermittent leg cramps nonexertional\par No recent hospital admission\par

## 2023-03-29 NOTE — CARDIOLOGY SUMMARY
[de-identified] : June 11, 2021.  Normal sinus rhythm.  Left anterior hemiblock.  Prominent R wave.  In V1.  Nonspecific ST-T changes.\par January 30, 2023 normal sinus rhythm left anterior hemiblock nonspecific ST-T change [de-identified] : 9/9/21: secho; nnischemic symptoms/ekg/echo at 8 mets.  [de-identified] : 7/20/21 lvef 60% mild mr/ar \par February 2023.  Preserved EF.  Mild to moderate AR.  Mild MR [de-identified] : March 2023 coronary calcium score 0.  Aortic calcification. [de-identified] : 7/20/21 MIKIE 50-69% tortuosity.\par February 2023.  Mild to moderate atherosclerosis.

## 2023-03-29 NOTE — DISCUSSION/SUMMARY
[FreeTextEntry1] : 72-year-old female with above medical history and active medical problems as noted below\par 1.  Abnormal EKG.  Left anterior hemiblock.  Nonspecific ST-T changes.  Intermittent palpitations.  Atherosclerotic vascular disease with carotid Doppler study and aortic calcification.\par Evidence of ASCVD reviewed at length with her.\par Atherosclerosis: From the available data evidence of atherosclerosis was reviewed.  We have discussed atherosclerosis is a generalized process.  The pathophysiology of the atherosclerosis  and associated morbidity mortality were reviewed.  Lifestyle modification and risk factors associated with atherosclerosis were discussed.  changes needed in lifestyle and risk factors were reviewed at length.  Understands prevention is the best way  in decreasing morbidity mortality and overall improving long-term prognosis.\par Reviewed risk benefits alternatives and side effects of statin therapy along with aspirin at length.\par Extensive amount of time was spent reviewing and answering all her questions.\par She still has not been sure whether she wants to take the medication understanding about.\par If she decided she would benefit from moderate to intensified statin therapy along with 81 mg of aspirin.\par 2.   Nonrheumatic mitral and aortic insufficiency.  Stable echocardiogram\par 3.  Carotid Doppler study.  Mild to moderate atherosclerosis.  Aspirin and statin therapy recommended\par 4. Rare intermittent palpitations.  If worsening will consider cardiac monitoring.  At present she has no significant symptoms associated with those palpitations.\par \par \par Counseling regarding low saturated fat, salt and carbohydrate intake was reviewed. Active lifestyle and regular. Exercise along with weight management is advised.\par All the above were at length reviewed. Answered all the questions. Thank you very much for this kind referral. Please do not hesitate to give me a call for any question.\par Part of this transcription was done with voice recognition software and phonetically similar errors are common. I apologize for that. Please do not hesitate to call for any questions due to above.\par Sincerely,\par Timoteo Amezquita MD,FACC,FERNANDA\par \par \par

## 2023-03-29 NOTE — ASSESSMENT
[FreeTextEntry1] : January 23, 2018.\par Carotid Doppler study. Possible mild atherosclerosis.\par Echocardiogram. December 14, 2017. Preserved systolic function. Mitral regurgitation.\par Labs from October 2017 reviewed.\par Coronary calcium score January 31, 2018 was 0.\par \par Reviewed on June 11, 2021.\par EKG as noted above.\par Most recent labs not available.\par Last labs from September 2017 had shown LDL cholesterol 203  triglycerides 57 total cholesterol 319\par Most recent labs are requested\par \par reviewed 9/14/21: \par ldl 152, hdl 89, trig 68\par \par Reviewed on January 30, 2023\par EKG as noted above\par Labs from January 9, 2023 reviewed CBC stable CMP normal  HDL 83 triglycerides 80\par \par Reviewed.  March 29 2023.\par Echocardiogram carotid Doppler study were reviewed.\par Coronary calcium score was reviewed which showed 0 calcium score.  Calcification of the ascending aorta\par Reviewed labs.   HDL 76 triglycerides 79 total cholesterol 234

## 2023-03-29 NOTE — PHYSICAL EXAM
[Well Developed] : well developed [Well Nourished] : well nourished [No Acute Distress] : no acute distress [Normal Venous Pressure] : normal venous pressure [Normal S1, S2] : normal S1, S2 [No Rub] : no rub [No Gallop] : no gallop [Clear Lung Fields] : clear lung fields [Good Air Entry] : good air entry [No Respiratory Distress] : no respiratory distress  [Normal Gait] : normal gait [No Edema] : no edema [No Cyanosis] : no cyanosis [No Clubbing] : no clubbing [No Varicosities] : no varicosities [Normal Radial B/L] : normal radial B/L [Normal DP B/L] : normal DP B/L [Moves all extremities] : moves all extremities [Normal Speech] : normal speech [Alert and Oriented] : alert and oriented [de-identified] : Decreased carotid upstroke.  Radiated systolic murmur probably at left carotid base. [de-identified] : Systolic ejection murmur 2-3 over 6 at the base.

## 2023-10-16 ENCOUNTER — APPOINTMENT (OUTPATIENT)
Dept: CARDIOLOGY | Facility: CLINIC | Age: 73
End: 2023-10-16
Payer: MEDICARE

## 2023-10-16 VITALS
BODY MASS INDEX: 23.39 KG/M2 | SYSTOLIC BLOOD PRESSURE: 110 MMHG | HEIGHT: 64 IN | OXYGEN SATURATION: 98 % | WEIGHT: 137 LBS | HEART RATE: 80 BPM | DIASTOLIC BLOOD PRESSURE: 68 MMHG

## 2023-10-16 PROCEDURE — 99214 OFFICE O/P EST MOD 30 MIN: CPT

## 2023-10-16 RX ORDER — UBIDECARENONE 50 MG
600 CAPSULE ORAL
Refills: 0 | Status: DISCONTINUED | COMMUNITY
End: 2023-10-16

## 2023-11-02 ENCOUNTER — NON-APPOINTMENT (OUTPATIENT)
Age: 73
End: 2023-11-02

## 2023-11-03 ENCOUNTER — APPOINTMENT (OUTPATIENT)
Dept: CARDIOLOGY | Facility: CLINIC | Age: 73
End: 2023-11-03
Payer: MEDICARE

## 2023-11-03 ENCOUNTER — NON-APPOINTMENT (OUTPATIENT)
Age: 73
End: 2023-11-03

## 2023-11-03 VITALS
DIASTOLIC BLOOD PRESSURE: 70 MMHG | BODY MASS INDEX: 23.39 KG/M2 | WEIGHT: 137 LBS | HEART RATE: 63 BPM | HEIGHT: 64 IN | SYSTOLIC BLOOD PRESSURE: 110 MMHG | OXYGEN SATURATION: 98 %

## 2023-11-03 DIAGNOSIS — R07.89 OTHER CHEST PAIN: ICD-10-CM

## 2023-11-03 DIAGNOSIS — R94.31 ABNORMAL ELECTROCARDIOGRAM [ECG] [EKG]: ICD-10-CM

## 2023-11-03 PROCEDURE — 93000 ELECTROCARDIOGRAM COMPLETE: CPT

## 2023-11-03 PROCEDURE — 99214 OFFICE O/P EST MOD 30 MIN: CPT

## 2023-11-03 RX ORDER — EZETIMIBE 10 MG/1
10 TABLET ORAL
Qty: 90 | Refills: 1 | Status: ACTIVE | COMMUNITY
Start: 2023-11-03 | End: 1900-01-01

## 2023-11-03 RX ORDER — ATORVASTATIN CALCIUM 20 MG/1
20 TABLET, FILM COATED ORAL
Qty: 90 | Refills: 3 | Status: DISCONTINUED | COMMUNITY
Start: 2023-10-16 | End: 2023-11-03

## 2024-01-17 ENCOUNTER — NON-APPOINTMENT (OUTPATIENT)
Age: 74
End: 2024-01-17

## 2024-01-17 ENCOUNTER — APPOINTMENT (OUTPATIENT)
Dept: OPHTHALMOLOGY | Facility: CLINIC | Age: 74
End: 2024-01-17
Payer: MEDICARE

## 2024-01-17 PROCEDURE — 92014 COMPRE OPH EXAM EST PT 1/>: CPT

## 2024-01-17 PROCEDURE — 92134 CPTRZ OPH DX IMG PST SGM RTA: CPT

## 2024-04-09 ENCOUNTER — APPOINTMENT (OUTPATIENT)
Dept: CARDIOLOGY | Facility: CLINIC | Age: 74
End: 2024-04-09
Payer: MEDICARE

## 2024-04-09 ENCOUNTER — TRANSCRIPTION ENCOUNTER (OUTPATIENT)
Age: 74
End: 2024-04-09

## 2024-04-09 PROCEDURE — 93880 EXTRACRANIAL BILAT STUDY: CPT

## 2024-04-09 PROCEDURE — 93306 TTE W/DOPPLER COMPLETE: CPT

## 2024-05-07 ENCOUNTER — APPOINTMENT (OUTPATIENT)
Dept: CARDIOLOGY | Facility: CLINIC | Age: 74
End: 2024-05-07
Payer: MEDICARE

## 2024-05-07 VITALS
DIASTOLIC BLOOD PRESSURE: 64 MMHG | OXYGEN SATURATION: 99 % | WEIGHT: 137 LBS | BODY MASS INDEX: 23.52 KG/M2 | HEART RATE: 58 BPM | SYSTOLIC BLOOD PRESSURE: 120 MMHG

## 2024-05-07 DIAGNOSIS — E78.5 HYPERLIPIDEMIA, UNSPECIFIED: ICD-10-CM

## 2024-05-07 DIAGNOSIS — R25.2 CRAMP AND SPASM: ICD-10-CM

## 2024-05-07 DIAGNOSIS — I65.23 OCCLUSION AND STENOSIS OF BILATERAL CAROTID ARTERIES: ICD-10-CM

## 2024-05-07 DIAGNOSIS — I70.0 ATHEROSCLEROSIS OF AORTA: ICD-10-CM

## 2024-05-07 DIAGNOSIS — I34.0 NONRHEUMATIC MITRAL (VALVE) INSUFFICIENCY: ICD-10-CM

## 2024-05-07 DIAGNOSIS — I35.1 NONRHEUMATIC AORTIC (VALVE) INSUFFICIENCY: ICD-10-CM

## 2024-05-07 PROCEDURE — G2211 COMPLEX E/M VISIT ADD ON: CPT

## 2024-05-07 PROCEDURE — 99214 OFFICE O/P EST MOD 30 MIN: CPT

## 2024-05-07 RX ORDER — ATORVASTATIN CALCIUM 10 MG/1
10 TABLET, FILM COATED ORAL DAILY
Qty: 90 | Refills: 3 | Status: ACTIVE | COMMUNITY
Start: 2024-05-07 | End: 1900-01-01

## 2024-05-07 RX ORDER — MAGNESIUM OXIDE/MAG AA CHELATE 300 MG
CAPSULE ORAL
Refills: 0 | Status: ACTIVE | COMMUNITY

## 2024-05-07 RX ORDER — ESTROGENS, CONJUGATED 0.45 MG/1
TABLET, FILM COATED ORAL WEEKLY
Refills: 0 | Status: ACTIVE | COMMUNITY

## 2024-05-07 NOTE — HISTORY OF PRESENT ILLNESS
[FreeTextEntry1] : 73-year-old comes into the office for hospital follow-up.  Main problem is plantar fasciitis.  Could not tolerate atorvastatin in the past.  Now tolerating Zetia 10 mg without any significant new symptoms.  Labs were reviewed.  Echocardiogram was reviewed.  She has no complaint of chest pain.  No PND orthopnea palpitation dizziness lightheadedness visual disturbances or focal weakness.  She has continued cramping in the legs though less after stopping atorvastatin.  She has a history of incomplete right bundle branch block, nonischemic stress echocardiogram, mild mitral and mild to moderate aortic insufficiency, gastroesophageal reflux disease, carotid atherosclerosis, hyperlipidemia diet controlled head decided against statin therapy. Occasional rare intermittent palpitation without associated dizziness lightheadedness near syncopal or syncopal eventNo recent hospital admission

## 2024-05-07 NOTE — REVIEW OF SYSTEMS
[Dyspnea on exertion] : dyspnea during exertion [Chest Discomfort] : chest discomfort [Muscle Cramps] : muscle cramps [Negative] : Heme/Lymph [SOB] : no shortness of breath [Lower Ext Edema] : no extremity edema [Leg Claudication] : no intermittent leg claudication [Palpitations] : no palpitations [Orthopnea] : no orthopnea [PND] : no PND [Syncope] : no syncope [Joint Pain] : no joint pain [Joint Swelling] : no joint swelling [Joint Stiffness] : no joint stiffness [Myalgia] : no myalgia [FreeTextEntry5] : As per HPI

## 2024-05-07 NOTE — DISCUSSION/SUMMARY
[FreeTextEntry1] : 73-year-old female with above medical history and active medical problems as noted below 1.  Labs reviewed.  Mild elevation in CPK.  Mild elevation LFT.  Plan to fasciitis.  Repeat labs with hep screen. 2.  Abnormal EKG.  Left anterior hemiblock.  Nonspecific ST-T changes.  Intermittent palpitations.  Atherosclerotic vascular disease with carotid Doppler study and aortic calcification. Evidence of ASCVD reviewed at length with her.  Coronary calcium score was 0. #3 dyslipidemia.   Lifestyle modifications. Zetia 10 mg.  Reviewed again risk benefits alternatives of lower dose of atorvastatin in presence of ASCVD on carotid Doppler study.  She is agreeable to try it 3 times a week.  If significant worsening symptoms she will stop it.  Will also check CPK and LFT in 4 weeks after starting the medication.  Understands risk benefits and alternatives. #4 nonrheumatic mitral and aortic insufficiency.  Stable echocardiogram reviewed.  Continue to follow on 1-2 yearly basis or based on clinical status. #5  Carotid Doppler study.  Mild to moderate atherosclerosis.  Aspirin and statin therapy recommended. Carotid Doppler study reviewed.  Stable findings discussed.    Counseling regarding low saturated fat, salt and carbohydrate intake was reviewed. Active lifestyle and regular. Exercise along with weight management is advised. All the above were at length reviewed. Answered all the questions. Thank you very much for this kind referral. Please do not hesitate to give me a call for any question. Part of this transcription was done with voice recognition software and phonetically similar errors are common. I apologize for that. Please do not hesitate to call for any questions due to above. Sincerely, Timoteo Amezquita MD,FACC,FERNANDA

## 2024-05-07 NOTE — CARDIOLOGY SUMMARY
[de-identified] : June 11, 2021.  Normal sinus rhythm.  Left anterior hemiblock.  Prominent R wave.  In V1.  Nonspecific ST-T changes. January 30, 2023 normal sinus rhythm left anterior hemiblock nonspecific ST-T change November 3, 2023 normal sinus rhythm left anterior hemiblock nonspecific ST-T changes Reviewed EKG from November 2, 2023 emergency room normal significant new changes. [de-identified] : 9/9/21: secho; nnischemic symptoms/ekg/echo at 8 mets.  [de-identified] : 7/20/21 lvef 60% mild mr/ar  February 2023.  Preserved EF.  Mild to moderate AR.  Mild MR April 9, 2024.  EF 55 to 60% LAE.  Mild to moderate AR, trace MR and TR.  PASP 23. [de-identified] : March 2023 coronary calcium score 0.  Aortic calcification. [de-identified] : 7/20/21 MIKIE 50-69% tortuosity. April 9, 2024.  Bilateral carotid Doppler study.  Tortuosity.  Mild to moderate nonobstructive disease.  Elevated vertebral arteries velocities. February 2023.  Mild to moderate atherosclerosis. [de-identified] : Chest x-ray November 2, 2023 at [facility] no acute pathology.

## 2024-05-07 NOTE — ASSESSMENT
[FreeTextEntry1] : Reviewed on November 2 October 2023 EKG as noted above from November 2 and November 3. Chest x-ray November 2, 2023 reviewed Labs in November 2, 2023 had shown stable CBC CMP.  Troponins were negative x2.  Reviewed on May 7, 2024. Echocardiogram April 2024 mild to moderate AR trace MR EF 55 to 60%. Most recent labs reviewed.  April 15, 2024.  Minimal elevation of CPK and LFT.  CMP otherwise stable. Carotid Doppler study reviewed.

## 2024-05-07 NOTE — PHYSICAL EXAM
[Well Developed] : well developed [Well Nourished] : well nourished [No Acute Distress] : no acute distress [Normal Venous Pressure] : normal venous pressure [Normal S1, S2] : normal S1, S2 [No Rub] : no rub [No Gallop] : no gallop [Clear Lung Fields] : clear lung fields [Good Air Entry] : good air entry [No Respiratory Distress] : no respiratory distress  [Normal Gait] : normal gait [No Edema] : no edema [No Cyanosis] : no cyanosis [No Clubbing] : no clubbing [No Varicosities] : no varicosities [Normal Radial B/L] : normal radial B/L [Normal DP B/L] : normal DP B/L [Moves all extremities] : moves all extremities [Normal Speech] : normal speech [Alert and Oriented] : alert and oriented [de-identified] : Decreased carotid upstroke.  Radiated systolic murmur probably at left carotid base. [de-identified] : Systolic ejection murmur 2-3 over 6 at the base.

## 2024-09-05 ENCOUNTER — APPOINTMENT (OUTPATIENT)
Dept: OPHTHALMOLOGY | Facility: CLINIC | Age: 74
End: 2024-09-05
Payer: MEDICARE

## 2024-09-05 ENCOUNTER — NON-APPOINTMENT (OUTPATIENT)
Age: 74
End: 2024-09-05

## 2024-09-05 PROCEDURE — 92012 INTRM OPH EXAM EST PATIENT: CPT

## 2024-11-25 ENCOUNTER — RX RENEWAL (OUTPATIENT)
Age: 74
End: 2024-11-25

## 2024-11-26 ENCOUNTER — APPOINTMENT (OUTPATIENT)
Dept: CARDIOLOGY | Facility: CLINIC | Age: 74
End: 2024-11-26
Payer: MEDICARE

## 2024-11-26 ENCOUNTER — NON-APPOINTMENT (OUTPATIENT)
Age: 74
End: 2024-11-26

## 2024-11-26 VITALS
SYSTOLIC BLOOD PRESSURE: 102 MMHG | DIASTOLIC BLOOD PRESSURE: 70 MMHG | BODY MASS INDEX: 23.39 KG/M2 | HEART RATE: 59 BPM | HEIGHT: 64 IN | OXYGEN SATURATION: 98 % | WEIGHT: 137 LBS

## 2024-11-26 DIAGNOSIS — I08.0 RHEUMATIC DISORDERS OF BOTH MITRAL AND AORTIC VALVES: ICD-10-CM

## 2024-11-26 DIAGNOSIS — I65.23 OCCLUSION AND STENOSIS OF BILATERAL CAROTID ARTERIES: ICD-10-CM

## 2024-11-26 DIAGNOSIS — R94.31 ABNORMAL ELECTROCARDIOGRAM [ECG] [EKG]: ICD-10-CM

## 2024-11-26 DIAGNOSIS — E78.5 HYPERLIPIDEMIA, UNSPECIFIED: ICD-10-CM

## 2024-11-26 DIAGNOSIS — I35.1 NONRHEUMATIC AORTIC (VALVE) INSUFFICIENCY: ICD-10-CM

## 2024-11-26 DIAGNOSIS — I70.0 ATHEROSCLEROSIS OF AORTA: ICD-10-CM

## 2024-11-26 PROCEDURE — 99214 OFFICE O/P EST MOD 30 MIN: CPT

## 2024-11-26 PROCEDURE — 93000 ELECTROCARDIOGRAM COMPLETE: CPT

## 2025-01-23 ENCOUNTER — APPOINTMENT (OUTPATIENT)
Dept: OPHTHALMOLOGY | Facility: CLINIC | Age: 75
End: 2025-01-23
Payer: MEDICARE

## 2025-01-23 ENCOUNTER — NON-APPOINTMENT (OUTPATIENT)
Age: 75
End: 2025-01-23

## 2025-01-23 PROCEDURE — 92134 CPTRZ OPH DX IMG PST SGM RTA: CPT

## 2025-01-23 PROCEDURE — 92014 COMPRE OPH EXAM EST PT 1/>: CPT

## 2025-01-27 ENCOUNTER — APPOINTMENT (OUTPATIENT)
Dept: ORTHOPEDIC SURGERY | Facility: CLINIC | Age: 75
End: 2025-01-27
Payer: MEDICARE

## 2025-01-27 DIAGNOSIS — S52.531A COLLES' FRACTURE OF RIGHT RADIUS, INITIAL ENCOUNTER FOR CLOSED FRACTURE: ICD-10-CM

## 2025-01-27 DIAGNOSIS — S52.532A COLLES' FRACTURE OF LEFT RADIUS, INITIAL ENCOUNTER FOR CLOSED FRACTURE: ICD-10-CM

## 2025-01-27 DIAGNOSIS — S52.571A OTHER INTRAARTICULAR FRACTURE OF LOWER END OF RIGHT RADIUS, INITIAL ENCOUNTER FOR CLOSED FRACTURE: ICD-10-CM

## 2025-01-27 PROCEDURE — 99214 OFFICE O/P EST MOD 30 MIN: CPT

## 2025-01-27 PROCEDURE — 99204 OFFICE O/P NEW MOD 45 MIN: CPT

## 2025-01-27 PROCEDURE — L3908: CPT | Mod: PD

## 2025-01-27 PROCEDURE — A4565: CPT | Mod: PD

## 2025-01-27 RX ORDER — ESTROGENS, CONJUGATED 0.45 MG/1
TABLET, FILM COATED ORAL
Refills: 0 | Status: ACTIVE | COMMUNITY

## 2025-01-28 ENCOUNTER — APPOINTMENT (OUTPATIENT)
Dept: ORTHOPEDIC SURGERY | Facility: CLINIC | Age: 75
End: 2025-01-28

## 2025-01-28 ENCOUNTER — OUTPATIENT (OUTPATIENT)
Dept: OUTPATIENT SERVICES | Facility: HOSPITAL | Age: 75
LOS: 1 days | End: 2025-01-28
Payer: MEDICARE

## 2025-01-28 ENCOUNTER — APPOINTMENT (OUTPATIENT)
Facility: CLINIC | Age: 75
End: 2025-01-28

## 2025-01-28 VITALS
OXYGEN SATURATION: 98 % | WEIGHT: 139.99 LBS | SYSTOLIC BLOOD PRESSURE: 147 MMHG | TEMPERATURE: 98 F | HEART RATE: 83 BPM | RESPIRATION RATE: 16 BRPM | DIASTOLIC BLOOD PRESSURE: 83 MMHG | HEIGHT: 64 IN

## 2025-01-28 DIAGNOSIS — S62.009A UNSPECIFIED FRACTURE OF NAVICULAR [SCAPHOID] BONE OF UNSPECIFIED WRIST, INITIAL ENCOUNTER FOR CLOSED FRACTURE: ICD-10-CM

## 2025-01-28 DIAGNOSIS — M25.532 PAIN IN RIGHT WRIST: ICD-10-CM

## 2025-01-28 DIAGNOSIS — Z98.891 HISTORY OF UTERINE SCAR FROM PREVIOUS SURGERY: Chronic | ICD-10-CM

## 2025-01-28 DIAGNOSIS — S62.009B: ICD-10-CM

## 2025-01-28 DIAGNOSIS — M25.531 PAIN IN RIGHT WRIST: ICD-10-CM

## 2025-01-28 DIAGNOSIS — Z98.890 OTHER SPECIFIED POSTPROCEDURAL STATES: Chronic | ICD-10-CM

## 2025-01-28 DIAGNOSIS — Z01.818 ENCOUNTER FOR OTHER PREPROCEDURAL EXAMINATION: ICD-10-CM

## 2025-01-28 DIAGNOSIS — Z98.41 CATARACT EXTRACTION STATUS, RIGHT EYE: Chronic | ICD-10-CM

## 2025-01-28 DIAGNOSIS — S62.109A FRACTURE OF UNSPECIFIED CARPAL BONE, UNSPECIFIED WRIST, INITIAL ENCOUNTER FOR CLOSED FRACTURE: ICD-10-CM

## 2025-01-28 PROCEDURE — 73110 X-RAY EXAM OF WRIST: CPT | Mod: 50

## 2025-01-28 PROCEDURE — 99214 OFFICE O/P EST MOD 30 MIN: CPT | Mod: 57

## 2025-01-28 NOTE — H&P PST ADULT - NSICDXPROCEDURE_GEN_ALL_CORE_FT
PROCEDURES:  ORIF, fracture, distal tibia, right 28-Jan-2025 14:00:10 left distal radius fracture closed Catherine Lawler

## 2025-01-28 NOTE — H&P PST ADULT - NS MD HP INPLANTS MED DEV
Physical Therapy Evaluation and DC NOTE    Visit Count: 1     Seeing PT for post operative cervical surgery    PAYOR: MEDICARE  AUTHORIZATION NEEDED: NO, FOLLOW MEDICARE GUIDELINES     THRESHOLD AMOUNT: $2150.00  (2022)     PT AND ST COMBINED MET:$734.87           OT SEPARATE MET:$0     IF CONTINUED SERVICES ARE REASONABLE AND NECESSARY BEYOND $2150.00, A KX MODIFIER WILL BE NEEDED.  If you want to attest that continued services are medically necessary, please add:  HOD = KX MODIFER AT VISIT 16, AMB = KX MODIFIER AT VISIT 22      Referred by: BRIELLE Huber; Next provider visit (if known/scheduled): *prn  Medical Diagnosis (from order):   Diagnosis Information      Diagnosis    V72.84 (ICD-9-CM) - Z01.818 (ICD-10-CM) - Preop testing    162.9 (ICD-9-CM) - C34.92 (ICD-10-CM) - Non-small cell cancer of left lung (CMS/HCC)              Treatment Diagnosis: wekaness pending lung surgery  symptoms with impaired strength, impaired activity tolerance    Chart reviewed at time of initial evaluation (relevant co-morbidities, allergies, tests and medications listed)      Mr. Gonzales is a 75 year old male with a past medical history of diverticulosis, GERD, HTN, HLD, prostate cancer, cervical myelopathy s/p C3 through 7 decompression and fusion, CLL and biopsy proven adenocarcinoma of the left upper lobe lung nodule.    VATS scheduled for 12/1/22    SUBJECTIVE     Current Symptoms:  Has pending lung surgery 12/1/22  which he is moderately concerned about.  He is currently in PT for post cervical surgery which has been a very hard recovery.  He continues to work and finding to time to exercise is hard.  He is concerned about continuing to lose more strength and function      Function:  Limitations/exacerbating factors: difficulty, increased time to complete with bed mobility, computer tasks, dressing, driving, lifting/carrying, meal/food prep, pushing/pulling  Prior level: independent with all activities of daily living  and instrumental activities of daily living, pain free with all upper extremity use, sleep undisturbed  Patient Goal: to learn what to do preoperatively to improve post op unction and get out of the hospital ASAP.     Prior Treatment: no therapies in the past year for current condition. Hospitalization, home health services or skilled nursing facility in the last 30 days: No, per patient.  Home Environment/Social Support: Patient lives with significant other.  Patient has consistent assist from family/friends.    Safety:  Do you feel safe at home, work and/or school? yes, per patient  Patient denies 2 or more falls or an unexplained fall with injury in the last year, further testing not required     OBJECTIVE     Other than limitations at the C-spine post operatively, the patient does not have any exercise limiting or function limiting factors going into lung surgery on 12/1/222.    After today;s PT visit he is independent in a gentle aerobic and UE an LE strength program for before surgery .     Initial Treatment   Initial evaluation completed.    Therapeutic Exercise:   Therapeutic exercise to increase strength, joint function, AROM,cardiopulmonary function and ADL function; to decrease pain, risk and amount of fatigue, skeletal muscle loss, functional loss; to assist in maintining or improving proper body mass index.    Skilled input: verbal instruction/cues, facilitation    Home Program:  * above=instructed home program    Walk/t-mill 10-20 minutes daily 5 x / week, gentle strength for UE within restrictions 3 x / week, LE strength 3 x / week as able.    Writer verbally educated the patient and received verbal consent from the patient on hand placement, positioning of patient, and techniques to be performed today including therapist position for techniques as described above and how they are pertinent to the patient's plan of care.        ASSESSMENT   75 year old male patient has signs and symptoms consistent  with pending lung cancer surgery that has reported functional limitations listed above. Mr. Gonzales understand the importance of pre -operative exercise prior to his VATS in 12/1/22.    He does not need to return to PT prior to his surgery and is given my phone number for questions and support.     PLAN   Goals:  To be obtained by end of this plan of care:  1. Patient independent with modified and progressed home exercise program.   GOAL MET DC FROM PT.  Follow up with PT post operatively.    The following skilled interventions to be implemented to achieve above:  Therapeutic Exercise (58669)    patient involved in and agreed to plan of care and goals.   Attendance policy provided at time of evaluation.      Patient Education:   Who will be receiving education: patient  Are they ready to learn: yes  Preferred learning style: written, verbal, demonstration  Barriers to learning: no barriers apparent at this time   Result of initial outlined education: Verbalizes understanding and Demonstrates understanding    Therapy procedure time and total treatment time can be found documented on the Time Entry flowsheet   None

## 2025-01-28 NOTE — H&P PST ADULT - ASSESSMENT
75 y/o female with right distal fracture, left distal fracture  Planned surgery.- ORIF right distal ORIF, left distal radius fractur closed management  Will obtain medical clearance  EKGG in Chart  Labs from PMD-called and confirmed from DEC,2024  Pre op instructions provided  Instructions provided on medications to continue and to take the day morning of surgery

## 2025-01-28 NOTE — H&P PST ADULT - NSANTHOSAYNRD_GEN_A_CORE
No. MICHAEL screening performed.  STOP BANG Legend: 0-2 = LOW Risk; 3-4 = INTERMEDIATE Risk; 5-8 = HIGH Risk [FreeTextEntry1] : She was referred to me to assume her cardiovascular care.\par She is a 66-year-old with a history of multiple sclerosis and lung cancer who was noted to have mild coronary plaquing on a coronary CT angiogram done after an abnormal exercise stress test.  She reports feeling well overall and has been taking her medications as usual.\par Most recent LDL was 118 mg/dL.\par She does not exercise much anymore, mostly because of Covid related concerns.  She denies any exertional chest pains or shortness of breath during her usual activities.\par Her multiple sclerosis has been quiesced sent of late.  She did have a recent work-up for headaches that she noticed after her most recent dose of COVID-19 vaccine (moderate)\par She had a recent kidney stone as well.\par She has no history of myocardial infarction, congestive heart failure, stroke, renal insufficiency or diabetes mellitus.\par She has been treated for hypertension for years.

## 2025-01-28 NOTE — H&P PST ADULT - HISTORY OF PRESENT ILLNESS
75 y/o female with bilateral distal radius fracture after fall at her house 4 days ago. No other injuries reported. splint for right wrist and wearing a left wrist brace. Takes ibuprofen as needed with some relief.

## 2025-01-29 RX ORDER — CEFAZOLIN SODIUM IN 0.9 % NACL 2 G/10 ML
2000 SYRINGE (ML) INTRAVENOUS ONCE
Refills: 0 | Status: COMPLETED | OUTPATIENT
Start: 2025-01-30 | End: 2025-01-30

## 2025-01-29 RX ORDER — APREPITANT 40 MG/1
40 CAPSULE ORAL ONCE
Refills: 0 | Status: COMPLETED | OUTPATIENT
Start: 2025-01-30 | End: 2025-01-30

## 2025-01-29 RX ORDER — ANTISEPTIC SURGICAL SCRUB 0.04 MG/ML
1 SOLUTION TOPICAL ONCE
Refills: 0 | Status: COMPLETED | OUTPATIENT
Start: 2025-01-30 | End: 2025-01-30

## 2025-01-30 ENCOUNTER — APPOINTMENT (OUTPATIENT)
Dept: ORTHOPEDIC SURGERY | Facility: HOSPITAL | Age: 75
End: 2025-01-30

## 2025-01-30 ENCOUNTER — TRANSCRIPTION ENCOUNTER (OUTPATIENT)
Age: 75
End: 2025-01-30

## 2025-01-30 ENCOUNTER — INPATIENT (INPATIENT)
Facility: HOSPITAL | Age: 75
LOS: 0 days | Discharge: HOME | DRG: 563 | End: 2025-01-31
Attending: STUDENT IN AN ORGANIZED HEALTH CARE EDUCATION/TRAINING PROGRAM | Admitting: STUDENT IN AN ORGANIZED HEALTH CARE EDUCATION/TRAINING PROGRAM
Payer: MEDICARE

## 2025-01-30 VITALS
HEIGHT: 64 IN | HEART RATE: 74 BPM | RESPIRATION RATE: 20 BRPM | SYSTOLIC BLOOD PRESSURE: 139 MMHG | TEMPERATURE: 98 F | WEIGHT: 135.36 LBS | DIASTOLIC BLOOD PRESSURE: 77 MMHG | OXYGEN SATURATION: 100 %

## 2025-01-30 DIAGNOSIS — Z01.818 ENCOUNTER FOR OTHER PREPROCEDURAL EXAMINATION: ICD-10-CM

## 2025-01-30 DIAGNOSIS — S62.009A UNSPECIFIED FRACTURE OF NAVICULAR [SCAPHOID] BONE OF UNSPECIFIED WRIST, INITIAL ENCOUNTER FOR CLOSED FRACTURE: ICD-10-CM

## 2025-01-30 DIAGNOSIS — S62.009B: ICD-10-CM

## 2025-01-30 DIAGNOSIS — Z98.890 OTHER SPECIFIED POSTPROCEDURAL STATES: Chronic | ICD-10-CM

## 2025-01-30 DIAGNOSIS — Z98.41 CATARACT EXTRACTION STATUS, RIGHT EYE: Chronic | ICD-10-CM

## 2025-01-30 DIAGNOSIS — Z98.891 HISTORY OF UTERINE SCAR FROM PREVIOUS SURGERY: Chronic | ICD-10-CM

## 2025-01-30 PROCEDURE — 25609 OPTX DST RD XART FX/EP SEP3+: CPT | Mod: RT

## 2025-01-30 PROCEDURE — 25600 CLTX DST RDL FX/EPHYS SEP WO: CPT | Mod: 59,RT

## 2025-01-30 PROCEDURE — 25280 REVISE WRIST/FOREARM TENDON: CPT | Mod: RT

## 2025-01-30 DEVICE — PEG VOLAR UNTHREADED 18MM: Type: IMPLANTABLE DEVICE | Site: RIGHT | Status: FUNCTIONAL

## 2025-01-30 DEVICE — PEG VOLAR UNTHRD 16MM: Type: IMPLANTABLE DEVICE | Site: RIGHT | Status: FUNCTIONAL

## 2025-01-30 DEVICE — KWIRE 1.1: Type: IMPLANTABLE DEVICE | Site: RIGHT | Status: FUNCTIONAL

## 2025-01-30 DEVICE — PEG VOLAR UNTHRD 14MM: Type: IMPLANTABLE DEVICE | Site: RIGHT | Status: FUNCTIONAL

## 2025-01-30 DEVICE — SCREW CORT HEX 3.2X13MM: Type: IMPLANTABLE DEVICE | Site: RIGHT | Status: FUNCTIONAL

## 2025-01-30 DEVICE — SCREW CORT HEX 3.2X11MM: Type: IMPLANTABLE DEVICE | Site: RIGHT | Status: FUNCTIONAL

## 2025-01-30 DEVICE — IMPLANTABLE DEVICE: Type: IMPLANTABLE DEVICE | Site: RIGHT | Status: FUNCTIONAL

## 2025-01-30 RX ORDER — ONDANSETRON 4 MG/1
4 TABLET, ORALLY DISINTEGRATING ORAL ONCE
Refills: 0 | Status: COMPLETED | OUTPATIENT
Start: 2025-01-30 | End: 2025-01-30

## 2025-01-30 RX ORDER — ACETAMINOPHEN 160 MG/5ML
1000 SUSPENSION ORAL EVERY 8 HOURS
Refills: 0 | Status: DISCONTINUED | OUTPATIENT
Start: 2025-01-31 | End: 2025-01-31

## 2025-01-30 RX ORDER — OXYCODONE HYDROCHLORIDE 30 MG/1
1 TABLET ORAL
Qty: 20 | Refills: 0
Start: 2025-01-30 | End: 2025-02-05

## 2025-01-30 RX ORDER — ONDANSETRON 4 MG/1
4 TABLET, ORALLY DISINTEGRATING ORAL EVERY 6 HOURS
Refills: 0 | Status: DISCONTINUED | OUTPATIENT
Start: 2025-01-30 | End: 2025-01-31

## 2025-01-30 RX ORDER — IBUPROFEN 600 MG/1
1 TABLET, FILM COATED ORAL
Refills: 0 | DISCHARGE

## 2025-01-30 RX ORDER — OXYCODONE HYDROCHLORIDE 30 MG/1
10 TABLET ORAL
Refills: 0 | Status: DISCONTINUED | OUTPATIENT
Start: 2025-01-30 | End: 2025-01-31

## 2025-01-30 RX ORDER — HYDROMORPHONE HYDROCHLORIDE 4 MG/ML
0.5 INJECTION, SOLUTION INTRAMUSCULAR; INTRAVENOUS; SUBCUTANEOUS
Refills: 0 | Status: DISCONTINUED | OUTPATIENT
Start: 2025-01-30 | End: 2025-01-31

## 2025-01-30 RX ORDER — EZETIMIBE 10 MG
10 TABLET ORAL DAILY
Refills: 0 | Status: DISCONTINUED | OUTPATIENT
Start: 2025-01-30 | End: 2025-01-31

## 2025-01-30 RX ORDER — HYDROMORPHONE HYDROCHLORIDE 4 MG/ML
0.5 INJECTION, SOLUTION INTRAMUSCULAR; INTRAVENOUS; SUBCUTANEOUS
Refills: 0 | Status: DISCONTINUED | OUTPATIENT
Start: 2025-01-30 | End: 2025-01-30

## 2025-01-30 RX ORDER — SODIUM CHLORIDE 9 G/ML
1000 INJECTION, SOLUTION INTRAVENOUS
Refills: 0 | Status: DISCONTINUED | OUTPATIENT
Start: 2025-01-30 | End: 2025-01-30

## 2025-01-30 RX ORDER — OXYCODONE HYDROCHLORIDE 30 MG/1
5 TABLET ORAL
Refills: 0 | Status: DISCONTINUED | OUTPATIENT
Start: 2025-01-30 | End: 2025-01-31

## 2025-01-30 RX ORDER — HYDROMORPHONE HYDROCHLORIDE 4 MG/ML
0.2 INJECTION, SOLUTION INTRAMUSCULAR; INTRAVENOUS; SUBCUTANEOUS
Refills: 0 | Status: DISCONTINUED | OUTPATIENT
Start: 2025-01-30 | End: 2025-01-30

## 2025-01-30 RX ORDER — CELECOXIB 200 MG
200 CAPSULE ORAL EVERY 12 HOURS
Refills: 0 | Status: DISCONTINUED | OUTPATIENT
Start: 2025-01-30 | End: 2025-01-31

## 2025-01-30 RX ORDER — CEFAZOLIN SODIUM IN 0.9 % NACL 2 G/10 ML
2000 SYRINGE (ML) INTRAVENOUS EVERY 8 HOURS
Refills: 0 | Status: COMPLETED | OUTPATIENT
Start: 2025-01-30 | End: 2025-01-31

## 2025-01-30 RX ORDER — ACETAMINOPHEN 160 MG/5ML
1000 SUSPENSION ORAL ONCE
Refills: 0 | Status: COMPLETED | OUTPATIENT
Start: 2025-01-30 | End: 2025-01-30

## 2025-01-30 RX ORDER — KETOROLAC TROMETHAMINE 10 MG
30 TABLET ORAL ONCE
Refills: 0 | Status: DISCONTINUED | OUTPATIENT
Start: 2025-01-30 | End: 2025-01-30

## 2025-01-30 RX ORDER — EZETIMIBE 10 MG
1 TABLET ORAL
Refills: 0 | DISCHARGE

## 2025-01-30 RX ADMIN — HYDROMORPHONE HYDROCHLORIDE 0.5 MILLIGRAM(S): 4 INJECTION, SOLUTION INTRAMUSCULAR; INTRAVENOUS; SUBCUTANEOUS at 15:18

## 2025-01-30 RX ADMIN — Medication 30 MILLIGRAM(S): at 16:00

## 2025-01-30 RX ADMIN — OXYCODONE HYDROCHLORIDE 5 MILLIGRAM(S): 30 TABLET ORAL at 22:00

## 2025-01-30 RX ADMIN — HYDROMORPHONE HYDROCHLORIDE 0.5 MILLIGRAM(S): 4 INJECTION, SOLUTION INTRAMUSCULAR; INTRAVENOUS; SUBCUTANEOUS at 15:50

## 2025-01-30 RX ADMIN — ANTISEPTIC SURGICAL SCRUB 1 APPLICATION(S): 0.04 SOLUTION TOPICAL at 11:02

## 2025-01-30 RX ADMIN — HYDROMORPHONE HYDROCHLORIDE 0.5 MILLIGRAM(S): 4 INJECTION, SOLUTION INTRAMUSCULAR; INTRAVENOUS; SUBCUTANEOUS at 15:07

## 2025-01-30 RX ADMIN — ACETAMINOPHEN 400 MILLIGRAM(S): 160 SUSPENSION ORAL at 20:55

## 2025-01-30 RX ADMIN — Medication 30 MILLIGRAM(S): at 16:30

## 2025-01-30 RX ADMIN — ACETAMINOPHEN 1000 MILLIGRAM(S): 160 SUSPENSION ORAL at 21:30

## 2025-01-30 RX ADMIN — SODIUM CHLORIDE 75 MILLILITER(S): 9 INJECTION, SOLUTION INTRAVENOUS at 15:08

## 2025-01-30 RX ADMIN — Medication 200 MILLIGRAM(S): at 20:55

## 2025-01-30 RX ADMIN — Medication 200 MILLIGRAM(S): at 21:31

## 2025-01-30 RX ADMIN — APREPITANT 40 MILLIGRAM(S): 40 CAPSULE ORAL at 11:02

## 2025-01-30 RX ADMIN — Medication 100 MILLIGRAM(S): at 21:23

## 2025-01-30 RX ADMIN — OXYCODONE HYDROCHLORIDE 5 MILLIGRAM(S): 30 TABLET ORAL at 21:28

## 2025-01-30 RX ADMIN — ONDANSETRON 4 MILLIGRAM(S): 4 TABLET, ORALLY DISINTEGRATING ORAL at 15:08

## 2025-01-30 RX ADMIN — HYDROMORPHONE HYDROCHLORIDE 0.5 MILLIGRAM(S): 4 INJECTION, SOLUTION INTRAMUSCULAR; INTRAVENOUS; SUBCUTANEOUS at 15:23

## 2025-01-30 NOTE — ASU DISCHARGE PLAN (ADULT/PEDIATRIC) - CARE PROVIDER_API CALL
Artemio Howe  Surgery of the Hand  [address], [address]  [place] [address]  Phone: [phone]  Fax: [phone]  Follow Up Time:

## 2025-01-30 NOTE — ASU DISCHARGE PLAN (ADULT/PEDIATRIC) - NS MD DC FALL RISK RISK
For information on Fall & Injury Prevention, visit: [email or web address] OR  [email or web address] OR  https://www.cdc.gov/steadi/patient.html

## 2025-01-30 NOTE — ASU PATIENT PROFILE, ADULT - FALL HARM RISK - RISK INTERVENTIONS

## 2025-01-30 NOTE — ASU PATIENT PROFILE, ADULT - REASON FOR ADMISSION, PROFILE
right distal radius open reduction internal fixation and left distal radius fractureclosed management

## 2025-01-30 NOTE — ASU DISCHARGE PLAN (ADULT/PEDIATRIC) - ASU DC SPECIAL INSTRUCTIONSFT
Elevate   arm in sling daily when up & walking.  Elevate the  hand/arm above heart level on pillow/blankets when lying down.  Apply ice packs to top of surgical site for 20 min on and off  Keep bandage clean, dry , & intact until seen in office  May open & close the fingers of the operated arm every hour for exercise.  Call the Dr.  for fever, severe pain, fall or hand injury.  Call for an appointment for office visit  in  10 days. Elevate the  hand/arm above heart level on pillow/blankets   Apply ice packs to top of surgical site for 20 min on and off  Keep bandage clean, dry , & intact until seen in office  May open & close the fingers of the operated arm every hour for exercise.  Call the Dr.  for fever, severe pain, fall or hand injury.  Call for an appointment for office visit  in  10 days.

## 2025-01-30 NOTE — BRIEF OPERATIVE NOTE - NSICDXBRIEFPROCEDURE_GEN_ALL_CORE_FT
PROCEDURES:  Open reduction and internal fixation of right distal radius and ulna 31-Jan-2025 09:33:19  Steffi Rosa

## 2025-01-30 NOTE — ASU DISCHARGE PLAN (ADULT/PEDIATRIC) - FINANCIAL ASSISTANCE
[facility] provides services at a reduced cost to those who are determined to be eligible through [facility]’s financial assistance program. Information regarding [facility]’s financial assistance program can be found by going to [email or web address] or by calling [phone].

## 2025-01-30 NOTE — SOCIAL WORK PROGRESS NOTE - NSSWPROGRESSNOTE_GEN_ALL_CORE
SW received call from patient yesterday who is having surgery today bilateral wrist fx. She was concerned about going home with spouse who recently had cardiac surgery. SW attempted to provide support and education on skilled needs for inpatient rehab or home care. Spouse will be driving her here for surgery and she thought she may need inpatient rehab and wanted [facility]. SW asked if surgeon said she will need therapy and she said OT. JORY explained that inpatient rehab is for skilled service and not alf care. Same for home care PT. SW received call now from surgeon who said he will not want her doing therapy for 4 weeks. SW explained above and that if admitted she may not qualify for home care or inpatient [facility] with bilateral wrist surgery. From conversation patient has alf needs which family could assume care. He will discuss with patient.  SW received call from patient yesterday who is having surgery today bilateral wrist fx. She was concerned about going home with spouse who recently had cardiac surgery. SW attempted to provide support and education on skilled needs for inpatient rehab or home care. Spouse will be driving her here for surgery and she thought she may need inpatient rehab and wanted [facility]. SW asked if surgeon said she will need therapy and she said OT. SW explained that inpatient rehab is for skilled service and not [facility] care. Same for home care PT. SW received call now from surgeon who said he will not want her doing therapy for 4 weeks. SW explained above to patient and to surgeon and that if admitted she may not qualify for home care or inpatient [facility] with bilateral wrist surgery. From conversation patient has [facility] needs which family could assume care or private hire assistance at home. Surgeon will discuss with patient. SW spoke with nurse in preop Kassandra ext [phone] and she said she added consult for: pt having bilateral hand surgery, concerned about caring for herself, would like to explore [facility] care post-op.  Dr Howe aware and discussed with patient.  Requesting social work input and assistance. SW was able to provide family with private hire [facility] number and spouse will contact them [phone]. SW remains available.

## 2025-01-31 ENCOUNTER — TRANSCRIPTION ENCOUNTER (OUTPATIENT)
Age: 75
End: 2025-01-31

## 2025-01-31 VITALS
SYSTOLIC BLOOD PRESSURE: 124 MMHG | OXYGEN SATURATION: 98 % | DIASTOLIC BLOOD PRESSURE: 74 MMHG | TEMPERATURE: 98 F | HEART RATE: 64 BPM | RESPIRATION RATE: 18 BRPM

## 2025-01-31 PROCEDURE — 97161 PT EVAL LOW COMPLEX 20 MIN: CPT

## 2025-01-31 PROCEDURE — 99221 1ST HOSP IP/OBS SF/LOW 40: CPT

## 2025-01-31 PROCEDURE — 25280 REVISE WRIST/FOREARM TENDON: CPT

## 2025-01-31 PROCEDURE — 76000 FLUOROSCOPY <1 HR PHYS/QHP: CPT

## 2025-01-31 PROCEDURE — 25600 CLTX DST RDL FX/EPHYS SEP WO: CPT

## 2025-01-31 PROCEDURE — C1713: CPT

## 2025-01-31 PROCEDURE — 25609 OPTX DST RD XART FX/EP SEP3+: CPT

## 2025-01-31 PROCEDURE — 97165 OT EVAL LOW COMPLEX 30 MIN: CPT

## 2025-01-31 PROCEDURE — G0463: CPT

## 2025-01-31 RX ORDER — ACETAMINOPHEN 160 MG/5ML
2 SUSPENSION ORAL
Qty: 0 | Refills: 0 | DISCHARGE
Start: 2025-01-31

## 2025-01-31 RX ADMIN — ACETAMINOPHEN 1000 MILLIGRAM(S): 160 SUSPENSION ORAL at 13:18

## 2025-01-31 RX ADMIN — Medication 200 MILLIGRAM(S): at 09:39

## 2025-01-31 RX ADMIN — Medication 10 MILLIGRAM(S): at 13:18

## 2025-01-31 RX ADMIN — ACETAMINOPHEN 1000 MILLIGRAM(S): 160 SUSPENSION ORAL at 05:04

## 2025-01-31 RX ADMIN — ACETAMINOPHEN 1000 MILLIGRAM(S): 160 SUSPENSION ORAL at 05:35

## 2025-01-31 RX ADMIN — ACETAMINOPHEN 1000 MILLIGRAM(S): 160 SUSPENSION ORAL at 13:48

## 2025-01-31 RX ADMIN — Medication 100 MILLIGRAM(S): at 05:03

## 2025-01-31 RX ADMIN — Medication 200 MILLIGRAM(S): at 09:09

## 2025-01-31 NOTE — CASE MANAGEMENT PROGRESS NOTE - NSCMPROGRESSNOTE_GEN_ALL_CORE
TRANSITION OF CARE PLAN:   DC planning and education with patient at bedside; RN CCM explained to patient and family at bedside that; patient's referral was sent to several [facility] agencies for consideration PER patient request, however, being that patient is independent in ambulation and has no deficits; there is a possibility that the [facility] agencies may not accept her due to not meeting home care criteria; in this case, patient will need to follow up with ortho surgeon in two weeks  to determine when she is ready for out-patient therapy, patient verbalized understanding; and has decided to go home with family;

## 2025-01-31 NOTE — DISCHARGE NOTE NURSING/CASE MANAGEMENT/SOCIAL WORK - NSDCFUADDAPPT_GEN_ALL_CORE_FT
- Call your doctor if you experience:  • An increase in pain not controlled by pain medication or change in activity or  position.  • Temperature greater than 101° F.  • Redness, increased swelling or foul smelling drainage from or around the  incision.  • Numbness, tingling or a change in color or temperature of the operative leg.  • Call your doctor immediately if you experience chest pain, shortness of breath or calf pain.  - Call your doctor if you experience:  • An increase in pain not controlled by pain medication or change in activity or  position.  • Temperature greater than 101° F.  • Redness, increased swelling or foul smelling drainage from or around the  incision.  • Numbness, tingling or a change in color or temperature of the operative leg.  • Call your doctor immediately if you experience chest pain, shortness of breath or calf pain.     TRANSITION OF CARE PLAN:   DC planning and education with patient at bedside; RN CCM explained to patient and family at bedside that; patient's referral was sent to several [facility] agencies for consideration PER patient request, however, being that patient is independent in ambulation and has no deficits; there is a possibility that the [facility] agencies may not accept her due to not meeting home care criteria; in this case, patient will need to follow up with ortho surgeon in two weeks  to determine when she is ready for out-patient therapy, patient verbalized understanding; and has decided to go home with family; ASantaromana, RN CCM

## 2025-01-31 NOTE — DISCHARGE NOTE NURSING/CASE MANAGEMENT/SOCIAL WORK - PATIENT PORTAL LINK FT
You can access the FollowMyHealth Patient Portal offered by [facility] by registering at the following website: [email or web address]. By joining [employer]’s FollowMyHealth portal, you will also be able to view your health information using other applications (apps) compatible with our system.

## 2025-01-31 NOTE — OCCUPATIONAL THERAPY INITIAL EVALUATION ADULT - PERTINENT HX OF CURRENT PROBLEM, REHAB EVAL
history of fall 75 y/o female with bilateral distal radius fracture after fall at her house. Patient underwent right wrist open reduction internal fixation left wrist braced by Dr. Howe on 1/30/25 .

## 2025-01-31 NOTE — CARE COORDINATION ASSESSMENT. - OTHER PERTINENT REFERRAL INFORMATION
Pt admitted with bilateral wrist fx (surgery performed on one hand). She lives with her significant other and was indep prior to this admission. SW met with the pt earlier this morning. During that time, she requested rehab at [facility] vs returning home. Tx team is recommending home. Per [facility] admissions, bed is not available this weekend. SW met with the pt, who stated that she wants to return home today. Pt's PCP is Dr Dane Amezquita in [facility]. Her pharmacy is [facility] in [facility]. CM is following the pt for dc needs.

## 2025-01-31 NOTE — DISCHARGE NOTE PROVIDER - HOSPITAL COURSE
This patient was admitted to [facility] with a history of fall 73 y/o female with bilateral distal radius fracture after fall at her house 4 days ago. No other injuries reported. splint for right wrist and wearing a left wrist brace.       Patient underwent Pre-Surgical Testing and was medically cleared to undergo elective procedure. Patient underwent right wrist open reduction internal fixation left wrist braced by Dr. Howe on 1/30/25 . Procedure was well tolerated.  No operative or josé miguel-operative complications arose during patient's hospital course.  Patient received antibiotic according to SCIP guidelines for infection prevention.  PAS was given for DVT prophylaxis, in addition to the use of SCDs.  Anesthesia, Medical Hospitalist, Physical Therapy and Occupational Therapy were consulted. Patient is stable for discharge with a good prognosis.  Appropriate discharge instructions and medications are provided in this document. NON  weight bearing on  bilateral wrists

## 2025-01-31 NOTE — DISCHARGE NOTE PROVIDER - NSDCFUSCHEDAPPT_GEN_ALL_CORE_FT
Artemio Howe  [facility]  [address]  Scheduled Appointment: 02/12/2025    [facility]  CARDIOLOGY [address]  Scheduled Appointment: 04/30/2025    [facility]  CARDIOLOGY [address]  Scheduled Appointment: 04/30/2025    [facility]  CARDIOLOGY [address]  Scheduled Appointment: 04/30/2025

## 2025-01-31 NOTE — PHYSICAL THERAPY INITIAL EVALUATION ADULT - ADDITIONAL COMMENTS
Pt has 6 steps to enter. 1 flight of stairs inside to bedroom but pt states is able to stay on main level with access to a bedroom/bathroom with a tub shower. Stall shower upstairs. Does not own any DME. Pt states spouse had recent cardiac surgery.

## 2025-01-31 NOTE — OCCUPATIONAL THERAPY INITIAL EVALUATION ADULT - THERAPY FREQUENCY, OT EVAL
Detail Level: Generalized Azelaic Acid Pregnancy And Lactation Text: This medication is considered safe during pregnancy and breast feeding. Topical Clindamycin Counseling: Patient counseled that this medication may cause skin irritation or allergic reactions.  In the event of skin irritation, the patient was advised to reduce the amount of the drug applied or use it less frequently.   The patient verbalized understanding of the proper use and possible adverse effects of clindamycin.  All of the patient's questions and concerns were addressed. Dapsone Counseling: I discussed with the patient the risks of dapsone including but not limited to hemolytic anemia, agranulocytosis, rashes, methemoglobinemia, kidney failure, peripheral neuropathy, headaches, GI upset, and liver toxicity.  Patients who start dapsone require monitoring including baseline LFTs and weekly CBCs for the first month, then every month thereafter.  The patient verbalized understanding of the proper use and possible adverse effects of dapsone.  All of the patient's questions and concerns were addressed. Isotretinoin Pregnancy And Lactation Text: This medication is Pregnancy Category X and is considered extremely dangerous during pregnancy. It is unknown if it is excreted in breast milk. Aklief Pregnancy And Lactation Text: It is unknown if this medication is safe to use during pregnancy.  It is unknown if this medication is excreted in breast milk.  Breastfeeding women should use the topical cream on the smallest area of the skin for the shortest time needed while breastfeeding.  Do not apply to nipple and areola. Sarecycline Counseling: Patient advised regarding possible photosensitivity and discoloration of the teeth, skin, lips, tongue and gums.  Patient instructed to avoid sunlight, if possible.  When exposed to sunlight, patients should wear protective clothing, sunglasses, and sunscreen.  The patient was instructed to call the office immediately if the following severe adverse effects occur:  hearing changes, easy bruising/bleeding, severe headache, or vision changes.  The patient verbalized understanding of the proper use and possible adverse effects of sarecycline.  All of the patient's questions and concerns were addressed. Include Pregnancy/Lactation Warning?: No Azithromycin Pregnancy And Lactation Text: This medication is considered safe during pregnancy and is also secreted in breast milk. Doxycycline Counseling:  Patient counseled regarding possible photosensitivity and increased risk for sunburn.  Patient instructed to avoid sunlight, if possible.  When exposed to sunlight, patients should wear protective clothing, sunglasses, and sunscreen.  The patient was instructed to call the office immediately if the following severe adverse effects occur:  hearing changes, easy bruising/bleeding, severe headache, or vision changes.  The patient verbalized understanding of the proper use and possible adverse effects of doxycycline.  All of the patient's questions and concerns were addressed. High Dose Vitamin A Pregnancy And Lactation Text: High dose vitamin A therapy is contraindicated during pregnancy and breast feeding. Tazorac Counseling:  Patient advised that medication is irritating and drying.  Patient may need to apply sparingly and wash off after an hour before eventually leaving it on overnight.  The patient verbalized understanding of the proper use and possible adverse effects of tazorac.  All of the patient's questions and concerns were addressed. Topical Retinoid counseling:  Patient advised to apply a pea-sized amount only at bedtime and wait 30 minutes after washing their face before applying.  If too drying, patient may add a non-comedogenic moisturizer. The patient verbalized understanding of the proper use and possible adverse effects of retinoids.  All of the patient's questions and concerns were addressed. Tetracycline Pregnancy And Lactation Text: This medication is Pregnancy Category D and not consider safe during pregnancy. It is also excreted in breast milk. Patient Specific Counseling (Will Not Stick From Patient To Patient): Pt experiencing acne on face\\nOnset 2 years\\nPt t/f doxycycline tablets\\nPt t/f differin gel\\nPt was on accutane 40 mg for 5 months (dec 2022-may 2023) w/ improvement\\nPt reports that acne flare ups started again in summer 2024\\nPt recently tx'ed w/ Tretinoin 0.25% w/ slight improvement Winlevi Pregnancy And Lactation Text: This medication is considered safe during pregnancy and breastfeeding. Detail Level: Zone Bactrim Pregnancy And Lactation Text: This medication is Pregnancy Category D and is known to cause fetal risk.  It is also excreted in breast milk. Erythromycin Counseling:  I discussed with the patient the risks of erythromycin including but not limited to GI upset, allergic reaction, drug rash, diarrhea, increase in liver enzymes, and yeast infections. Spironolactone Pregnancy And Lactation Text: This medication can cause feminization of the male fetus and should be avoided during pregnancy. The active metabolite is also found in breast milk. Topical Sulfur Applications Pregnancy And Lactation Text: This medication is Pregnancy Category C and has an unknown safety profile during pregnancy. It is unknown if this topical medication is excreted in breast milk. Benzoyl Peroxide Counseling: Patient counseled that medicine may cause skin irritation and bleach clothing.  In the event of skin irritation, the patient was advised to reduce the amount of the drug applied or use it less frequently.   The patient verbalized understanding of the proper use and possible adverse effects of benzoyl peroxide.  All of the patient's questions and concerns were addressed. Birth Control Pills Pregnancy And Lactation Text: This medication should be avoided if pregnant and for the first 30 days post-partum. 2-3x/week Isotretinoin Counseling: Patient should get monthly blood tests, not donate blood, not drive at night if vision affected, not share medication, and not undergo elective surgery for 6 months after tx completed. Side effects reviewed, pt to contact office should one occur. Topical Clindamycin Pregnancy And Lactation Text: This medication is Pregnancy Category B and is considered safe during pregnancy. It is unknown if it is excreted in breast milk. Azithromycin Counseling:  I discussed with the patient the risks of azithromycin including but not limited to GI upset, allergic reaction, drug rash, diarrhea, and yeast infections. Dapsone Pregnancy And Lactation Text: This medication is Pregnancy Category C and is not considered safe during pregnancy or breast feeding. Azelaic Acid Counseling: Patient counseled that medicine may cause skin irritation and to avoid applying near the eyes.  In the event of skin irritation, the patient was advised to reduce the amount of the drug applied or use it less frequently.   The patient verbalized understanding of the proper use and possible adverse effects of azelaic acid.  All of the patient's questions and concerns were addressed. Tazorac Pregnancy And Lactation Text: This medication is not safe during pregnancy. It is unknown if this medication is excreted in breast milk. High Dose Vitamin A Counseling: Side effects reviewed, pt to contact office should one occur. Aklief counseling:  Patient advised to apply a pea-sized amount only at bedtime and wait 30 minutes after washing their face before applying.  If too drying, patient may add a non-comedogenic moisturizer.  The most commonly reported side effects including irritation, redness, scaling, dryness, stinging, burning, itching, and increased risk of sunburn.  The patient verbalized understanding of the proper use and possible adverse effects of retinoids.  All of the patient's questions and concerns were addressed. Bactrim Counseling:  I discussed with the patient the risks of sulfa antibiotics including but not limited to GI upset, allergic reaction, drug rash, diarrhea, dizziness, photosensitivity, and yeast infections.  Rarely, more serious reactions can occur including but not limited to aplastic anemia, agranulocytosis, methemoglobinemia, blood dyscrasias, liver or kidney failure, lung infiltrates or desquamative/blistering drug rashes. Doxycycline Pregnancy And Lactation Text: This medication is Pregnancy Category D and not consider safe during pregnancy. It is also excreted in breast milk but is considered safe for shorter treatment courses. Minocycline Counseling: Patient advised regarding possible photosensitivity and discoloration of the teeth, skin, lips, tongue and gums.  Patient instructed to avoid sunlight, if possible.  When exposed to sunlight, patients should wear protective clothing, sunglasses, and sunscreen.  The patient was instructed to call the office immediately if the following severe adverse effects occur:  hearing changes, easy bruising/bleeding, severe headache, or vision changes.  The patient verbalized understanding of the proper use and possible adverse effects of minocycline.  All of the patient's questions and concerns were addressed. Topical Retinoid Pregnancy And Lactation Text: This medication is Pregnancy Category C. It is unknown if this medication is excreted in breast milk. Winlevi Counseling:  I discussed with the patient the risks of topical clascoterone including but not limited to erythema, scaling, itching, and stinging. Patient voiced their understanding. Benzoyl Peroxide Pregnancy And Lactation Text: This medication is Pregnancy Category C. It is unknown if benzoyl peroxide is excreted in breast milk. Tetracycline Counseling: Patient counseled regarding possible photosensitivity and increased risk for sunburn.  Patient instructed to avoid sunlight, if possible.  When exposed to sunlight, patients should wear protective clothing, sunglasses, and sunscreen.  The patient was instructed to call the office immediately if the following severe adverse effects occur:  hearing changes, easy bruising/bleeding, severe headache, or vision changes.  The patient verbalized understanding of the proper use and possible adverse effects of tetracycline.  All of the patient's questions and concerns were addressed. Patient understands to avoid pregnancy while on therapy due to potential birth defects. Birth Control Pills Counseling: Birth Control Pill Counseling: I discussed with the patient the potential side effects of OCPs including but not limited to increased risk of stroke, heart attack, thrombophlebitis, deep venous thrombosis, hepatic adenomas, breast changes, GI upset, headaches, and depression.  The patient verbalized understanding of the proper use and possible adverse effects of OCPs. All of the patient's questions and concerns were addressed. Erythromycin Pregnancy And Lactation Text: This medication is Pregnancy Category B and is considered safe during pregnancy. It is also excreted in breast milk. Spironolactone Counseling: Patient advised regarding risks of diarrhea, abdominal pain, hyperkalemia, birth defects (for female patients), liver toxicity and renal toxicity. The patient may need blood work to monitor liver and kidney function and potassium levels while on therapy. The patient verbalized understanding of the proper use and possible adverse effects of spironolactone.  All of the patient's questions and concerns were addressed. Topical Sulfur Applications Counseling: Topical Sulfur Counseling: Patient counseled that this medication may cause skin irritation or allergic reactions.  In the event of skin irritation, the patient was advised to reduce the amount of the drug applied or use it less frequently.   The patient verbalized understanding of the proper use and possible adverse effects of topical sulfur application.  All of the patient's questions and concerns were addressed.

## 2025-01-31 NOTE — DISCHARGE NOTE PROVIDER - NSDCMRMEDTOKEN_GEN_ALL_CORE_FT
acetaminophen 500 mg oral tablet: 2 tab(s) orally every 8 hours  ezetimibe 10 mg oral tablet: 1 tab(s) orally once a day  ibuprofen 600 mg oral tablet: 1 tab(s) orally 3 times a day  oxyCODONE 5 mg oral tablet: 1 tab(s) orally every 4 hours as needed for  moderate pain For severe pain, may take 2 tabs as needed. Do Not exceed 6 tabs per day. MDD: 6   acetaminophen 500 mg oral tablet: 2 tab(s) orally every 8 hours  ezetimibe 10 mg oral tablet: 1 tab(s) orally once a day  Hospital Bed: Patient will benefit from adjustable hospital bed for easier access and use. with bilateral distal radius fractures  ibuprofen 600 mg oral tablet: 1 tab(s) orally 3 times a day  oxyCODONE 5 mg oral tablet: 1 tab(s) orally every 4 hours as needed for  moderate pain For severe pain, may take 2 tabs as needed. Do Not exceed 6 tabs per day. MDD: 6

## 2025-01-31 NOTE — DISCHARGE NOTE PROVIDER - CARE PROVIDER_API CALL
Artemio Howe  Surgery of the Hand  [address], [address]  [place] [address]  Phone: [phone]  Fax: [phone]  Follow Up Time: 2 weeks   Artemio Howe  Surgery of the Hand  [address], [address]  [place] [address]  Phone: [phone]  Fax: [phone]  Follow Up Time: 2 weeks    Kamron Greenberg  NP in [address]  [place] [address]  Phone: [phone]  Fax: [phone]  Follow Up Time: 1 month

## 2025-01-31 NOTE — OCCUPATIONAL THERAPY INITIAL EVALUATION ADULT - ADDITIONAL COMMENTS
Pt lives in private home with spouse (who recently had Cardiac surgery). Pt has ERWIN and steps to bedroom. Pt has bedroom and bathroom on each floor however reports beds are very high and difficulty to get in and out of bed. Pt does not have DME at home, has tub and shower stall. Pt was ADL and transfer I PTA.

## 2025-01-31 NOTE — DISCHARGE NOTE PROVIDER - PROVIDER TOKENS
PROVIDER:[TOKEN:[[id number]],FOLLOWUP:[2 weeks]] PROVIDER:[TOKEN:[[id number]],FOLLOWUP:[2 weeks]],PROVIDER:[TOKEN:[[id number]],FOLLOWUP:[1 month]]

## 2025-01-31 NOTE — DISCHARGE NOTE PROVIDER - NSDCFUADDINST_GEN_ALL_CORE_FT
NON  weight bearing  bilaterally  upper extremity   wiggle fingers and hand    keep area clean  dry

## 2025-01-31 NOTE — PHYSICAL THERAPY INITIAL EVALUATION ADULT - PERTINENT HX OF CURRENT PROBLEM, REHAB EVAL
Open reduction and internal fixation of right distal radius and ulna 30 january 2025. 73 y/o female with bilateral distal radius fracture after fall at her house 4 days ago. No other injuries reported. splint for right wrist and wearing a left wrist brace. Takes ibuprofen as needed with some relief.

## 2025-01-31 NOTE — DISCHARGE NOTE NURSING/CASE MANAGEMENT/SOCIAL WORK - FINANCIAL ASSISTANCE
[facility] provides services at a reduced cost to those who are determined to be eligible through [facility]’s financial assistance program. Information regarding [facility]’s financial assistance program can be found by going to [email or web address] or by calling [phone].

## 2025-01-31 NOTE — DISCHARGE NOTE PROVIDER - NSDCCPTREATMENT_GEN_ALL_CORE_FT
PRINCIPAL PROCEDURE  Procedure: Open reduction and internal fixation of right distal radius and ulna  Findings and Treatment: NON   weight bearing on  right wrist ORIF  NON   weight bearing on left wrist with brace

## 2025-01-31 NOTE — OCCUPATIONAL THERAPY INITIAL EVALUATION ADULT - GENERAL OBSERVATIONS, REHAB EVAL
Pt received supine in bed in NAD with surgical dressing in place on R UE and wrist brace on L UE +IV lock.

## 2025-01-31 NOTE — CARE COORDINATION ASSESSMENT. - NSCAREPROVIDERS_GEN_ALL_CORE_FT
CARE PROVIDERS:  Administration: Karma Zhu  Administration: Danica Solis  Admitting: Artemio Howe  Attending: Artemio Hwoe  Case Management: Santaromana, Anna  Case Management: Criselda Francois  Infection Control: Sandra Flor  Nurse: Lesa Vazquez  Nurse: Ann Marie Flaherty  Nurse: Monie Bernstein  Nurse: Ronda Epstein  Nurse: Sarahi Rodriguez  Occupational Therapy: Maame Sands  Override: Monie Bernstein  Override: Alix Humphries  PCA/Nursing Assistant: Miladys Garcia  PCA/Nursing Assistant: Leatha Mar  Physical Therapy: Shandra Gillespie  Physical Therapy: Toby Smith  Primary Team: Saranya Suarez  Primary Team: Tereso Jennings  Primary Team: Steffi Rosa  Primary Team: Kassandra Mcnulty  Primary Team: Sameer Rodriguez  Registered Dietitian: Jacy Faith  Research: Suresh Flores  : Michelle Fuentes  : Essence Stallings  : Diana Kat  Team: [facility] Hospitalists, Team

## 2025-01-31 NOTE — DISCHARGE NOTE NURSING/CASE MANAGEMENT/SOCIAL WORK - NSDCPEFALRISK_GEN_ALL_CORE
For information on Fall & Injury Prevention, visit: [email or web address] OR  [email or web address] OR  https://www.cdc.gov/steadi/patient.html

## 2025-01-31 NOTE — DISCHARGE NOTE PROVIDER - NSDCCPCAREPLAN_GEN_ALL_CORE_FT
PRINCIPAL DISCHARGE DIAGNOSIS  Diagnosis: Wrist fracture, closed  Assessment and Plan of Treatment:

## 2025-01-31 NOTE — CONSULT NOTE ADULT - SUBJECTIVE AND OBJECTIVE BOX
HPI: 74F HLD, and Restless Leg Syndrome suffered bilaterally radius fractures after a fall at her house 4 days ago. She underwent ORIF of B/L Radius yesterday 25 and patient is currently resting in bed comfortable with good pain control.     REVIEW OF SYSTEMS:  CONSTITUTIONAL: No fever, weight loss, or fatigue  EYES: No eye pain, visual disturbances, or discharge  ENMT:  No difficulty hearing, tinnitus, vertigo; No sinus or throat pain  NECK: No pain or stiffness  RESPIRATORY: No cough, wheezing, chills or hemoptysis; No shortness of breath  CARDIOVASCULAR: No chest pain, palpitations, dizziness, or leg swelling  GASTROINTESTINAL: No abdominal or epigastric pain. No nausea, vomiting, or hematemesis; No diarrhea or constipation. No melena or hematochezia.  GENITOURINARY: No dysuria, frequency, hematuria, or incontinence  NEUROLOGICAL: No headaches, memory loss, loss of strength, numbness, or tremors  MUSCULOSKELETAL: No muscle or back pain      PAST MEDICAL & SURGICAL HISTORY:  HLD (hyperlipidemia)      Hypothyroid      Restless leg syndrome      History of sinus surgery      S/P cataract surgery, right      H/O:           SOCIAL HISTORY:  Tobacco; EtOH; Illicit Drugs    Allergies    No Known Allergies    Intolerances        MEDICATIONS  (STANDING):  acetaminophen     Tablet .. 1000 milliGRAM(s) Oral every 8 hours  celecoxib 200 milliGRAM(s) Oral every 12 hours  ezetimibe 10 milliGRAM(s) Oral daily    MEDICATIONS  (PRN):  HYDROmorphone  Injectable 0.5 milliGRAM(s) IV Push every 3 hours PRN Severe Pain (7 - 10)  ondansetron Injectable 4 milliGRAM(s) IV Push every 6 hours PRN Nausea and/or Vomiting  oxyCODONE    IR 10 milliGRAM(s) Oral every 3 hours PRN Moderate Pain (4 - 6)  oxyCODONE    IR 5 milliGRAM(s) Oral every 3 hours PRN Mild Pain (1 - 3)      FAMILY HISTORY:      Vital Signs Last 24 Hrs  T(C): 36.6 (2025 08:00), Max: 37.2 (2025 03:05)  T(F): 97.9 (2025 08:00), Max: 98.9 (2025 03:05)  HR: 64 (2025 08:00) (60 - 82)  BP: 124/74 (2025 08:00) (102/54 - 135/67)  BP(mean): --  RR: 18 (2025 08:00) (14 - 18)  SpO2: 98% (2025 08:00) (95% - 99%)    Parameters below as of 2025 08:00  Patient On (Oxygen Delivery Method): room air        PHYSICAL EXAM:    GENERAL: NAD, well-developed  HEAD:  Atraumatic, Normocephalic  EYES: EOMI, PERRLA, conjunctiva and sclera clear  ENMT: No tonsillar erythema, exudates, or enlargement; Moist mucous membranes, Good dentition, No lesions  NECK: Supple, No JVD, Normal thyroid  NERVOUS SYSTEM:  Alert & Oriented X3, Good concentration;  CHEST/LUNG: Clear to auscultation bilaterally; No rales, rhonchi, wheezing, or rubs  HEART: Regular rate and rhythm; No murmurs, rubs, or gallops  ABDOMEN: Soft, Nontender, Nondistended; Bowel sounds present  EXTREMITIES:  2+ Peripheral Pulses, No clubbing, cyanosis, or edema      LABS:              CAPILLARY BLOOD GLUCOSE          RADIOLOGY & ADDITIONAL STUDIES:    EKG:   Personally Reviewed:  [ ] YES     Imaging:   Personally Reviewed:  [ ] YES     Consultant(s) Notes Reviewed:      Care Discussed with Consultants/Other Providers:

## 2025-01-31 NOTE — DISCHARGE NOTE PROVIDER - NSDCFUADDAPPT_GEN_ALL_CORE_FT
- Call your doctor if you experience:  • An increase in pain not controlled by pain medication or change in activity or  position.  • Temperature greater than 101° F.  • Redness, increased swelling or foul smelling drainage from or around the  incision.  • Numbness, tingling or a change in color or temperature of the operative leg.  • Call your doctor immediately if you experience chest pain, shortness of breath or calf pain.  Schedule an appointment with [facility] Specialist to evaluate for osteoporosis. Given the history of multiple fractures, it is strongly encouraged you begin treatment for osteoporosis to prevent future fractures. Please call [phone] to Schedule your telehealth appointment with Dionicio Herrera.    - Call your doctor if you experience:  • An increase in pain not controlled by pain medication or change in activity or  position.  • Temperature greater than 101° F.  • Redness, increased swelling or foul smelling drainage from or around the  incision.  • Numbness, tingling or a change in color or temperature of the operative leg.  • Call your doctor immediately if you experience chest pain, shortness of breath or calf pain.

## 2025-01-31 NOTE — CARE COORDINATION ASSESSMENT. - NSPASTMEDSURGHISTORY_GEN_ALL_CORE_FT
PAST MEDICAL & SURGICAL HISTORY:  Restless leg syndrome      Hypothyroid      HLD (hyperlipidemia)      S/P cataract surgery, right      History of sinus surgery      H/O:

## 2025-01-31 NOTE — PHYSICAL THERAPY INITIAL EVALUATION ADULT - PLANNED THERAPY INTERVENTIONS, PT EVAL
Pt seen for PT evaluation. Pt is independent with bed mobility, transfers and ambulation of 300 feet without assistive device.  No needs for PT at this time.D/c from PT. [place] diaz.

## 2025-01-31 NOTE — CONSULT NOTE ADULT - ASSESSMENT
74F HLD, and Restless Leg Syndrome admitted for aftercare following ORIF B/L Radius    S/P ORIF B/L Radius  POD 0    Continue Bowel and pain control regimen;    Incentive Spirometer for lung expansion;   Monitor Hgb and follow up electrolytes.      Diet  Regular    Disposition  Patient medically optimized for discharge if cleared by PT and Ortho.

## 2025-01-31 NOTE — DISCHARGE NOTE PROVIDER - CARE PROVIDERS DIRECT ADDRESSES
,DirectAddress_Unknown ,DirectAddress_Unknown,[email or web address]

## 2025-02-01 ENCOUNTER — NON-APPOINTMENT (OUTPATIENT)
Age: 75
End: 2025-02-01

## 2025-02-12 ENCOUNTER — APPOINTMENT (OUTPATIENT)
Dept: ORTHOPEDIC SURGERY | Facility: CLINIC | Age: 75
End: 2025-02-12
Payer: MEDICARE

## 2025-02-12 VITALS — BODY MASS INDEX: 23.39 KG/M2 | HEIGHT: 64 IN | WEIGHT: 137 LBS

## 2025-02-12 DIAGNOSIS — S62.109A FRACTURE OF UNSPECIFIED CARPAL BONE, UNSPECIFIED WRIST, INITIAL ENCOUNTER FOR CLOSED FRACTURE: ICD-10-CM

## 2025-02-12 PROCEDURE — 99024 POSTOP FOLLOW-UP VISIT: CPT

## 2025-02-12 PROCEDURE — 73110 X-RAY EXAM OF WRIST: CPT | Mod: 50

## 2025-02-12 PROCEDURE — L3908: CPT | Mod: KX,RT

## 2025-03-05 ENCOUNTER — APPOINTMENT (OUTPATIENT)
Dept: ORTHOPEDIC SURGERY | Facility: CLINIC | Age: 75
End: 2025-03-05
Payer: MEDICARE

## 2025-03-05 VITALS — BODY MASS INDEX: 23.39 KG/M2 | WEIGHT: 137 LBS | HEIGHT: 64 IN

## 2025-03-05 DIAGNOSIS — S52.531A COLLES' FRACTURE OF RIGHT RADIUS, INITIAL ENCOUNTER FOR CLOSED FRACTURE: ICD-10-CM

## 2025-03-05 DIAGNOSIS — S52.532A COLLES' FRACTURE OF LEFT RADIUS, INITIAL ENCOUNTER FOR CLOSED FRACTURE: ICD-10-CM

## 2025-03-05 PROCEDURE — 73110 X-RAY EXAM OF WRIST: CPT | Mod: 50

## 2025-03-05 PROCEDURE — 99024 POSTOP FOLLOW-UP VISIT: CPT

## 2025-03-12 ENCOUNTER — APPOINTMENT (OUTPATIENT)
Dept: ORTHOPEDIC SURGERY | Facility: CLINIC | Age: 75
End: 2025-03-12

## 2025-04-09 ENCOUNTER — APPOINTMENT (OUTPATIENT)
Dept: ORTHOPEDIC SURGERY | Facility: CLINIC | Age: 75
End: 2025-04-09

## 2025-04-09 PROCEDURE — 99024 POSTOP FOLLOW-UP VISIT: CPT

## 2025-04-09 PROCEDURE — 73110 X-RAY EXAM OF WRIST: CPT | Mod: 50

## 2025-04-30 ENCOUNTER — APPOINTMENT (OUTPATIENT)
Dept: CARDIOLOGY | Facility: CLINIC | Age: 75
End: 2025-04-30
Payer: MEDICARE

## 2025-04-30 ENCOUNTER — NON-APPOINTMENT (OUTPATIENT)
Age: 75
End: 2025-04-30

## 2025-04-30 VITALS
OXYGEN SATURATION: 97 % | HEART RATE: 66 BPM | SYSTOLIC BLOOD PRESSURE: 122 MMHG | BODY MASS INDEX: 23.9 KG/M2 | DIASTOLIC BLOOD PRESSURE: 68 MMHG | WEIGHT: 140 LBS | HEIGHT: 64 IN

## 2025-04-30 DIAGNOSIS — I34.0 NONRHEUMATIC MITRAL (VALVE) INSUFFICIENCY: ICD-10-CM

## 2025-04-30 DIAGNOSIS — I65.23 OCCLUSION AND STENOSIS OF BILATERAL CAROTID ARTERIES: ICD-10-CM

## 2025-04-30 DIAGNOSIS — E78.5 HYPERLIPIDEMIA, UNSPECIFIED: ICD-10-CM

## 2025-04-30 DIAGNOSIS — R07.89 OTHER CHEST PAIN: ICD-10-CM

## 2025-04-30 DIAGNOSIS — R25.2 CRAMP AND SPASM: ICD-10-CM

## 2025-04-30 PROCEDURE — 99214 OFFICE O/P EST MOD 30 MIN: CPT

## 2025-04-30 PROCEDURE — G2211 COMPLEX E/M VISIT ADD ON: CPT

## 2025-04-30 PROCEDURE — 93306 TTE W/DOPPLER COMPLETE: CPT

## 2025-04-30 PROCEDURE — 93978 VASCULAR STUDY: CPT

## 2025-05-07 ENCOUNTER — NON-APPOINTMENT (OUTPATIENT)
Age: 75
End: 2025-05-07

## 2025-05-07 RX ORDER — EVOLOCUMAB 140 MG/ML
140 INJECTION, SOLUTION SUBCUTANEOUS
Qty: 2 | Refills: 5 | Status: ACTIVE | COMMUNITY
Start: 2025-04-30

## 2025-06-17 ENCOUNTER — APPOINTMENT (OUTPATIENT)
Dept: ORTHOPEDIC SURGERY | Facility: CLINIC | Age: 75
End: 2025-06-17

## 2025-06-17 VITALS — HEIGHT: 64 IN | RESPIRATION RATE: 16 BRPM | BODY MASS INDEX: 23.9 KG/M2 | WEIGHT: 140 LBS

## 2025-06-17 PROBLEM — S52.571D OTHER CLOSED INTRA-ARTICULAR FRACTURE OF DISTAL END OF RIGHT RADIUS WITH ROUTINE HEALING, SUBSEQUENT ENCOUNTER: Status: ACTIVE | Noted: 2025-06-17

## 2025-06-17 PROBLEM — M25.531 CHRONIC WRIST PAIN, RIGHT: Status: ACTIVE | Noted: 2025-06-17

## 2025-06-17 PROCEDURE — 20550 NJX 1 TENDON SHEATH/LIGAMENT: CPT

## 2025-06-17 PROCEDURE — 99204 OFFICE O/P NEW MOD 45 MIN: CPT | Mod: 25

## 2025-06-17 PROCEDURE — 73110 X-RAY EXAM OF WRIST: CPT | Mod: 50

## 2025-06-17 PROCEDURE — 73140 X-RAY EXAM OF FINGER(S): CPT | Mod: LT

## 2025-06-17 PROCEDURE — 20526 THER INJECTION CARP TUNNEL: CPT | Mod: LT

## 2025-07-29 ENCOUNTER — APPOINTMENT (OUTPATIENT)
Dept: ORTHOPEDIC SURGERY | Facility: CLINIC | Age: 75
End: 2025-07-29

## 2025-08-18 ENCOUNTER — NON-APPOINTMENT (OUTPATIENT)
Age: 75
End: 2025-08-18

## 2025-08-18 ENCOUNTER — APPOINTMENT (OUTPATIENT)
Dept: OPHTHALMOLOGY | Facility: CLINIC | Age: 75
End: 2025-08-18
Payer: MEDICARE

## 2025-08-18 PROCEDURE — 92014 COMPRE OPH EXAM EST PT 1/>: CPT

## 2025-08-18 PROCEDURE — 92134 CPTRZ OPH DX IMG PST SGM RTA: CPT

## (undated) DEVICE — Device

## (undated) DEVICE — DRAPE TOWEL BLUE 17" X 24"

## (undated) DEVICE — DRAPE C ARM MINI

## (undated) DEVICE — WARMING BLANKET FULL ADULT

## (undated) DEVICE — SLING SHOULDER IMMOBILIZER CLINIC LARGE

## (undated) DEVICE — POSITIONER BOOT CRADLE

## (undated) DEVICE — GLV 7.5 PROTEXIS (WHITE)

## (undated) DEVICE — DRAPE 3/4 SHEET 52X76"

## (undated) DEVICE — PACK UPPER EXTREMITY

## (undated) DEVICE — DRSG MASTISOL

## (undated) DEVICE — SUT MONOCRYL 4-0 27" PS-2 UNDYED

## (undated) DEVICE — SUT VICRYL PLUS 3-0 27" RB-1 UNDYED

## (undated) DEVICE — DRILL 2.3X80MM

## (undated) DEVICE — TOURNIQUET ESMARK 4"

## (undated) DEVICE — TOURNIQUET CUFF 18" DUAL PORT SINGLE BLADDER LUER LOCK (BLACK)

## (undated) DEVICE — DRAPE C ARM UNIVERSAL

## (undated) DEVICE — GLV 8 PROTEXIS (BLUE)

## (undated) DEVICE — DRSG STERISTRIPS 0.5 X 4"

## (undated) DEVICE — VENODYNE/SCD SLEEVE CALF MEDIUM

## (undated) DEVICE — SLING ARM CHIEFTAIN MESH MEDIUM

## (undated) DEVICE — WARMING BLANKET LOWER ADULT

## (undated) DEVICE — DRILL BIT TRIMED 1.8 X 90MM